# Patient Record
Sex: MALE | Race: BLACK OR AFRICAN AMERICAN | Employment: UNEMPLOYED | URBAN - METROPOLITAN AREA
[De-identification: names, ages, dates, MRNs, and addresses within clinical notes are randomized per-mention and may not be internally consistent; named-entity substitution may affect disease eponyms.]

---

## 2019-12-30 ENCOUNTER — HOSPITAL ENCOUNTER (EMERGENCY)
Age: 29
Discharge: HOME | End: 2019-12-30
Attending: EMERGENCY MEDICINE
Payer: SELF-PAY

## 2019-12-30 VITALS
SYSTOLIC BLOOD PRESSURE: 125 MMHG | WEIGHT: 248 LBS | DIASTOLIC BLOOD PRESSURE: 74 MMHG | HEIGHT: 75 IN | BODY MASS INDEX: 30.84 KG/M2 | TEMPERATURE: 99.1 F | RESPIRATION RATE: 18 BRPM | OXYGEN SATURATION: 99 % | HEART RATE: 88 BPM

## 2019-12-30 DIAGNOSIS — N34.2 URETHRITIS: Primary | ICD-10-CM

## 2019-12-30 LAB
APPEARANCE UR: ABNORMAL
BACTERIA URNS QL MICRO: NEGATIVE /HPF
BILIRUB UR QL CFM: NEGATIVE
COLOR UR: ABNORMAL
EPITH CASTS URNS QL MICRO: ABNORMAL /LPF
GLUCOSE UR STRIP.AUTO-MCNC: NEGATIVE MG/DL
HGB UR QL STRIP: ABNORMAL
KETONES UR QL STRIP.AUTO: NEGATIVE MG/DL
LEUKOCYTE ESTERASE UR QL STRIP.AUTO: ABNORMAL
NITRITE UR QL STRIP.AUTO: NEGATIVE
PH UR STRIP: 6 [PH] (ref 5–8)
PROT UR STRIP-MCNC: NEGATIVE MG/DL
RBC #/AREA URNS HPF: ABNORMAL /HPF (ref 0–5)
SP GR UR REFRACTOMETRY: 1.02 (ref 1–1.03)
UA: UC IF INDICATED,UAUC: ABNORMAL
UROBILINOGEN UR QL STRIP.AUTO: 1 EU/DL (ref 0.2–1)
WBC URNS QL MICRO: ABNORMAL /HPF (ref 0–4)

## 2019-12-30 PROCEDURE — 74011250637 HC RX REV CODE- 250/637: Performed by: NURSE PRACTITIONER

## 2019-12-30 PROCEDURE — 87491 CHLMYD TRACH DNA AMP PROBE: CPT

## 2019-12-30 PROCEDURE — 81001 URINALYSIS AUTO W/SCOPE: CPT

## 2019-12-30 PROCEDURE — 87086 URINE CULTURE/COLONY COUNT: CPT

## 2019-12-30 PROCEDURE — 99283 EMERGENCY DEPT VISIT LOW MDM: CPT

## 2019-12-30 RX ORDER — METRONIDAZOLE 500 MG/1
2000 TABLET ORAL
Status: COMPLETED | OUTPATIENT
Start: 2019-12-30 | End: 2019-12-30

## 2019-12-30 RX ORDER — AZITHROMYCIN 500 MG/1
2000 TABLET, FILM COATED ORAL
Status: COMPLETED | OUTPATIENT
Start: 2019-12-30 | End: 2019-12-30

## 2019-12-30 RX ADMIN — AZITHROMYCIN MONOHYDRATE 2000 MG: 500 TABLET ORAL at 13:09

## 2019-12-30 RX ADMIN — METRONIDAZOLE 2000 MG: 500 TABLET ORAL at 13:39

## 2019-12-30 NOTE — DISCHARGE INSTRUCTIONS
Patient Education        Urethritis: Care Instructions  Your Care Instructions    Urethritis is an infection of the tube that takes urine from the bladder to the outside of the body. This tube is called the urethra. The infection is often caused by bacteria. This can happen if you have a sexually transmitted infection (STI). But a virus may also be a cause. Urethritis is usually treated with antibiotics. Most cases clear up with treatment. Proper treatment is very important. If you don't treat it, the infection can lead to lasting damage of the urethra. Other parts of the urinary system can also be damaged. Follow-up care is a key part of your treatment and safety. Be sure to make and go to all appointments, and call your doctor if you are having problems. It's also a good idea to know your test results and keep a list of the medicines you take. How can you care for yourself at home? · If your doctor prescribed antibiotics, take them as directed. Do not stop taking them just because you feel better. You need to take the full course of antibiotics. · Take an over-the-counter pain medicine, such as acetaminophen (Tylenol), ibuprofen (Advil, Motrin), or naproxen (Aleve), if needed. Be safe with medicines. Read and follow all instructions on the label. · Do not take two or more pain medicines at the same time unless the doctor told you to. Many pain medicines have acetaminophen, which is Tylenol. Too much acetaminophen (Tylenol) can be harmful. · Your doctor may have you take phenazopyridine (Pyridium). This is a pain medicine for the urinary tract. It can turn your urine a deep red-orange. This is normal. Call your doctor if you think you are having a problem with your medicine. · Do not have sex until you are done with treatment. If you do have sex, be sure to use a condom. Your sex partner or partners should be tested too if your urethritis was caused by an STI.   · If your infection was caused by an injury or chemicals, avoid those things if you can. When should you call for help? Call your doctor now or seek immediate medical care if:    · You can't urinate.     · You have symptoms of a urinary infection. For example:  ? You have blood or pus in your urine. ? You have pain in your back just below your rib cage. This is called flank pain. ? You have a fever, chills, or body aches. ? It hurts to urinate. ? You have groin or belly pain.     · You have a hard time urinating when your bladder is full.     · You notice mental changes or feel confused.    Watch closely for changes in your health, and be sure to contact your doctor if:    · You do not get better as expected. Where can you learn more? Go to http://yaron-joy.info/. Enter F290 in the search box to learn more about \"Urethritis: Care Instructions. \"  Current as of: December 19, 2018  Content Version: 12.2  © 9339-9734 [employer]. Care instructions adapted under license by [employer] (which disclaims liability or warranty for this information). If you have questions about a medical condition or this instruction, always ask your healthcare professional. [place] [address] any warranty or liability for your use of this information.

## 2019-12-30 NOTE — ED NOTES
Discharge instructions were given to the patient by Narendra York RN. The patient left the Emergency Department ambulatory, alert and oriented and in no acute distress with 0 prescriptions. The patient was encouraged to call or return to the ED for worsening issues or problems and was encouraged to schedule a follow up appointment for continuing care. The patient verbalized understanding of discharge instructions and prescriptions, all questions were answered. The patient has no further concerns at this time.

## 2019-12-30 NOTE — ED PROVIDER NOTES
EMERGENCY DEPARTMENT HISTORY AND PHYSICAL EXAM    Date: 12/30/2019  Patient Name: Rip Saeed    History of Presenting Illness     Chief Complaint   Patient presents with    Exposure to STD         History Provided By: Patient    HPI: Rip Saeed is a 34 y.o. male with a PMH of No significant past medical history who presents with STD testing. Reports penile d/c intermittently for 4 days. Denies dysuria, urinary frequency or lesions. Pt reports exposure to trichomonas and requesting STD treatment and testing. Denies hx of STD    PCP: Joi Madison MD        Past History     Past Medical History:  Past Medical History:   Diagnosis Date    Other ill-defined conditions(799.89)     ADHD       Past Surgical History:  Past Surgical History:   Procedure Laterality Date    ABDOMEN SURGERY PROC UNLISTED      appendectomy    HX APPENDECTOMY         Family History:  History reviewed. No pertinent family history. Social History:  Social History     Tobacco Use    Smoking status: Current Every Day Smoker     Packs/day: 1.00    Smokeless tobacco: Never Used   Substance Use Topics    Alcohol use: Yes     Comment: social    Drug use: No     Comment: last use 1/9/12       Allergies: Allergies   Allergen Reactions    Amoxicillin Itching         Review of Systems   Review of Systems   Constitutional: Negative for chills, diaphoresis, fatigue and fever. HENT: Negative for sore throat. Respiratory: Negative for cough and shortness of breath. Cardiovascular: Negative for chest pain and leg swelling. Gastrointestinal: Negative for abdominal pain, nausea and vomiting. Genitourinary: Positive for discharge. Negative for dysuria, frequency, genital sores, hematuria, penile pain, penile swelling, scrotal swelling, testicular pain and urgency. Musculoskeletal: Negative for arthralgias and back pain. Skin: Negative for rash. Neurological: Negative for dizziness, numbness and headaches.    All other systems reviewed and are negative. Physical Exam     Vitals:    12/30/19 1231   BP: 125/74   Pulse: 88   Resp: 18   Temp: 99.1 °F (37.3 °C)   SpO2: 99%   Weight: 112.5 kg (248 lb)   Height: 6' 3\" (1.905 m)     Physical Exam  Vitals signs and nursing note reviewed. Constitutional:       General: He is not in acute distress. Appearance: He is well-developed. Cardiovascular:      Rate and Rhythm: Normal rate and regular rhythm. Heart sounds: Normal heart sounds. Pulmonary:      Effort: Pulmonary effort is normal.      Breath sounds: Normal breath sounds. Abdominal:      General: Bowel sounds are normal. There is no distension. Palpations: Abdomen is soft. Tenderness: There is no tenderness. Genitourinary:     Comments: Gu exam deferred   Musculoskeletal: Normal range of motion. Skin:     General: Skin is warm and dry. Neurological:      Mental Status: He is alert and oriented to person, place, and time. GCS: GCS eye subscore is 4. GCS verbal subscore is 5. GCS motor subscore is 6. Cranial Nerves: No cranial nerve deficit. Psychiatric:         Thought Content:  Thought content normal.           Diagnostic Study Results     Labs -     Recent Results (from the past 12 hour(s))   URINALYSIS W/ REFLEX CULTURE    Collection Time: 12/30/19 12:55 PM   Result Value Ref Range    Color YELLOW/STRAW      Appearance CLOUDY (A) CLEAR      Specific gravity 1.020 1.003 - 1.030      pH (UA) 6.0 5.0 - 8.0      Protein NEGATIVE  NEG mg/dL    Glucose NEGATIVE  NEG mg/dL    Ketone NEGATIVE  NEG mg/dL    Blood TRACE (A) NEG      Urobilinogen 1.0 0.2 - 1.0 EU/dL    Nitrites NEGATIVE  NEG      Leukocyte Esterase MODERATE (A) NEG      WBC 10-20 0 - 4 /hpf    RBC 0-5 0 - 5 /hpf    Epithelial cells FEW FEW /lpf    Bacteria NEGATIVE  NEG /hpf    UA:UC IF INDICATED URINE CULTURE ORDERED (A) CNI     BILIRUBIN, CONFIRM    Collection Time: 12/30/19 12:55 PM   Result Value Ref Range    Bilirubin UA, confirm NEGATIVE  NEG         Radiologic Studies -   No orders to display     CT Results  (Last 48 hours)    None        CXR Results  (Last 48 hours)    None            Medical Decision Making   I am the first provider for this patient. I reviewed the vital signs, available nursing notes, past medical history, past surgical history, family history and social history. Vital Signs-Reviewed the patient's vital signs. Records Reviewed: Nursing Notes, Old Medical Records and Previous Laboratory Studies            Disposition:  Discharge     DISCHARGE NOTE:   12:31      Care plan outlined and precautions discussed. Patient has no new complaints, changes, or physical findings. Results of UA were reviewed with the patient. All of pt's questions and concerns were addressed. Patient was instructed and agrees to follow up with health department, as well as to return to the ED upon further deterioration. Patient is ready to go home. Follow-up Information     Follow up With Specialties Details Why [id number]    [address] [place] [address]  [phone]          There are no discharge medications for this patient. Provider Notes (Medical Decision Making):   DDX: Chlamydia, Gonorrhea, urethritis, Candidiasis, Trichomonas, UTI    Procedures:  Procedures    Please note that this dictation was completed with Dragon, computer voice recognition software. Quite often unanticipated grammatical, syntax, homophones, and other interpretive errors are inadvertently transcribed by the computer software. Please disregard these errors. Additionally, please excuse any errors that have escaped final proofreading. Diagnosis     Clinical Impression:   1.  Urethritis

## 2019-12-30 NOTE — LETTER
1/3/2020 [address] [facility] [address] [phone] Dear Mr. Alice Wynne You were seen in the Emergency Department of [address] [place] [address] on 12/30/2019 and had lab and/or radiology tests performed. The chlamydia from your Emergency Department visit on 12/30/2019 was positive. You were treated appropriately during your visit. No further treatment is required. If you have not improved or are worsening, please follow up with your primary care doctor or Emergency department as soon as possible. Please follow up with you [facility] care doctor or health department. Your partner needs to be treated. If you have any questions please contact the Emergency Department at [phone]. Sincerely, Juani Lazaro PA-C 
 
[address] EMERGENCY DEPT 
[address] [facility] [address] [id number] [phone]

## 2019-12-30 NOTE — ED TRIAGE NOTES
Pt states partner tested positive for STD (unsure of name) and is requesting tx. Endorses penile discharge.

## 2019-12-30 NOTE — LETTER
1/2/2020 [address] [facility] [address] [id number] Dear Lakshmi Violet Terrence You were seen in the Emergency Department of [address] [place] [address] on 12/30/19 and had lab tests performed. The chlamydia test from your Emergency Department visit on 12/30/19 was positive. You were treated appropriately during your visit. No further treatment is required. Your partner needs to be treated. If you have any questions please contact the Emergency Department at [phone]. Sincerely, Herrera Bradley PA-C 
 
[facility] EMERGENCY DEPT 
[address] [place] [id number] [phone]

## 2019-12-30 NOTE — ED NOTES
Pt sts was advised by his sexual partner that he had been exposed to a STD. Sts she was positive for tric. Pt sts has had bloody discharge at the meatus in the morning. Denies dysuria, nausea, vomiting. Emergency Department Nursing Plan of Care       The Nursing Plan of Care is developed from the Nursing assessment and Emergency Department Attending provider initial evaluation. The plan of care may be reviewed in the ED Provider note.     The Plan of Care was developed with the following considerations:   Patient / Family readiness to learn indicated by:verbalized understanding  Persons(s) to be included in education: patient  Barriers to Learning/Limitations:No    Signed     Brenda Martin RN    12/30/2019   12:59 PM

## 2020-01-02 LAB
C TRACH DNA SPEC QL NAA+PROBE: POSITIVE
N GONORRHOEA DNA SPEC QL NAA+PROBE: NEGATIVE
SAMPLE TYPE: ABNORMAL
SERVICE CMNT-IMP: ABNORMAL
SPECIMEN SOURCE: ABNORMAL

## 2020-01-03 LAB
BACTERIA SPEC CULT: NORMAL
CC UR VC: NORMAL
SERVICE CMNT-IMP: NORMAL

## 2020-08-01 ENCOUNTER — HOSPITAL ENCOUNTER (EMERGENCY)
Age: 30
Discharge: HOME | End: 2020-08-01
Attending: EMERGENCY MEDICINE

## 2020-08-01 VITALS
TEMPERATURE: 98.2 F | RESPIRATION RATE: 18 BRPM | HEART RATE: 98 BPM | OXYGEN SATURATION: 97 % | WEIGHT: 242 LBS | DIASTOLIC BLOOD PRESSURE: 85 MMHG | SYSTOLIC BLOOD PRESSURE: 141 MMHG | HEIGHT: 74 IN | BODY MASS INDEX: 31.06 KG/M2

## 2020-08-01 DIAGNOSIS — J02.9 PHARYNGITIS, UNSPECIFIED ETIOLOGY: ICD-10-CM

## 2020-08-01 DIAGNOSIS — K04.7 DENTAL ABSCESS: Primary | ICD-10-CM

## 2020-08-01 PROCEDURE — 99283 EMERGENCY DEPT VISIT LOW MDM: CPT

## 2020-08-01 PROCEDURE — 74011250637 HC RX REV CODE- 250/637: Performed by: EMERGENCY MEDICINE

## 2020-08-01 RX ORDER — CLINDAMYCIN HYDROCHLORIDE 300 MG/1
300 CAPSULE ORAL 4 TIMES DAILY
Qty: 40 CAP | Refills: 0 | Status: SHIPPED | OUTPATIENT
Start: 2020-08-01

## 2020-08-01 RX ORDER — CLINDAMYCIN HYDROCHLORIDE 150 MG/1
300 CAPSULE ORAL
Status: COMPLETED | OUTPATIENT
Start: 2020-08-01 | End: 2020-08-01

## 2020-08-01 RX ADMIN — CLINDAMYCIN HYDROCHLORIDE 300 MG: 150 CAPSULE ORAL at 14:51

## 2020-08-01 NOTE — ED TRIAGE NOTES
Patient presents to ED with c/o vomiting for two days. State that it may have come from eating [address].  Patient admits to smoking marijuana

## 2020-08-01 NOTE — ED NOTES
Discharge instructions were given to the patient by TERESA Pulido RN. .     The patient left the Emergency Department ambulatory, alert and oriented and in no acute distress with 1 prescription(s). The patient was encouraged to call or return to the ED for worsening symptoms or problems and was encouraged to schedule a follow up appointment for continuing care. Patient leaving ED accompanied by self. The patient verbalized understanding of discharge instructions and prescriptions, all questions were answered. The patient has no further concerns at this time. Patient declined wheelchair transfer upon ED discharge.

## 2020-08-01 NOTE — ED PROVIDER NOTES
EMERGENCY DEPARTMENT HISTORY AND PHYSICAL EXAM      Date: 8/1/2020  Patient Name: Shon Hutton    History of Presenting Illness     Chief Complaint   Patient presents with    Vomiting       History Provided By: Patient    HPI: Shon Hutton, 31 y.o. male with PMHx of ADHD presents to the ED with cc of mild to moderate dental pain for a few days. Pt states \"it hurts to swallow\". Pt denies any other associated symptoms. Pt denies alleviating and exacerbating factors. Denies chills, SOB, CP, Abd pain. There are no other complaints, changes, or physical findings at this time. PCP: None    No current facility-administered medications on file prior to encounter. No current outpatient medications on file prior to encounter. Past History     Past Medical History:  Past Medical History:   Diagnosis Date    Other ill-defined conditions(799.89)     ADHD       Past Surgical History:  Past Surgical History:   Procedure Laterality Date    ABDOMEN SURGERY PROC UNLISTED      appendectomy    HX APPENDECTOMY         Family History:  No family history on file. Social History:  Social History     Tobacco Use    Smoking status: Current Every Day Smoker     Packs/day: 1.00    Smokeless tobacco: Never Used   Substance Use Topics    Alcohol use: Yes     Comment: social    Drug use: No     Comment: last use 1/9/12       Allergies: Allergies   Allergen Reactions    Amoxicillin Itching         Review of Systems   Review of Systems   Constitutional: Negative. Negative for chills, diaphoresis and fever. HENT: Positive for dental problem. Negative for congestion, ear pain, sore throat and trouble swallowing. Eyes: Negative. Negative for photophobia, pain, redness and visual disturbance. Respiratory: Negative. Negative for cough, chest tightness, shortness of breath and wheezing. Cardiovascular: Negative. Negative for chest pain and palpitations. Gastrointestinal: Negative.   Negative for abdominal pain, blood in stool, diarrhea, nausea and vomiting. Genitourinary: Negative for dysuria and frequency. Musculoskeletal: Negative. Negative for back pain, joint swelling and neck pain. Skin: Negative. Neurological: Negative. Negative for seizures, syncope and headaches. Psychiatric/Behavioral: Negative. Negative for behavioral problems and confusion. The patient is not nervous/anxious. All other systems reviewed and are negative. Physical Exam   Physical Exam  Vitals signs and nursing note reviewed. Constitutional:       General: He is not in acute distress. HENT:      Head: Normocephalic and atraumatic. Mouth/Throat:      Mouth: Mucous membranes are moist.      Comments: Erythema R tonsil, no exudates, no evidence of PTA,  tooth fracture 1st R lower with a surrounding tooth abscess  Eyes:      Conjunctiva/sclera: Conjunctivae normal.      Pupils: Pupils are equal, round, and reactive to light. Neck:      Musculoskeletal: Normal range of motion. Cardiovascular:      Rate and Rhythm: Normal rate and regular rhythm. Pulses: Normal pulses. Pulmonary:      Effort: Pulmonary effort is normal. No respiratory distress. Breath sounds: Normal breath sounds. Abdominal:      General: Bowel sounds are normal. There is no distension. Palpations: Abdomen is soft. Tenderness: There is no abdominal tenderness. Musculoskeletal: Normal range of motion. Skin:     Capillary Refill: Capillary refill takes less than 2 seconds. Findings: No rash. Neurological:      General: No focal deficit present. Mental Status: He is alert and oriented to person, place, and time. Cranial Nerves: No cranial nerve deficit. Psychiatric:         Behavior: Behavior normal.         Diagnostic Study Results     Labs -   No results found for this or any previous visit (from the past 12 hour(s)).     Radiologic Studies -   No orders to display     CT Results  (Last 48 hours)    None        CXR Results  (Last 48 hours)    None          Medical Decision Making   I am the first provider for this patient. I reviewed the vital signs, available nursing notes, past medical history, past surgical history, family history and social history. Vital Signs-Reviewed the patient's vital signs. Patient Vitals for the past 12 hrs:   Temp Pulse Resp BP SpO2   08/01/20 1304 98.2 °F (36.8 °C) 98 18 141/85 97 %         Records Reviewed: Nursing Notes and Old Medical Records    Provider Notes (Medical Decision Making):   Ddx: fx tooth, dental abscess, sore throat    ED Course:   Initial assessment performed. The patients presenting problems have been discussed, and they are in agreement with the care plan formulated and outlined with them. I have encouraged them to ask questions as they arise throughout their visit. Critical Care Time:   none      Disposition:  DISCHARGE  2:42 PM  The patient has been re-evaluated and is ready for discharge. Reviewed available results with patient. Counseled pt on diagnosis and care plan. Pt has expressed understanding, and all questions have been answered. Pt agrees with plan and agrees to follow up as recommended, or return to the ED if their symptoms worsen. Discharge instructions have been provided and explained to the pt, along with reasons to return to the ED. DISCHARGE PLAN:  1. Current Discharge Medication List      START taking these medications    Details   clindamycin (CLEOCIN) 300 mg capsule Take 1 Cap by mouth four (4) times daily. Qty: 40 Cap, Refills: 0           2. Follow-up Information     Follow up With Specialties Details Why Contact Info    [facility]  In 1 week  [address]  [phone]        3. Return to ED if worse     Diagnosis     Clinical Impression:   1. Dental abscess    2. Pharyngitis, unspecified etiology        Attestations:   This note is prepared by Saima Knight acting as Scribe for Tc Torres MD.     Tc Torres MD. The scribe's documentation has been prepared under my direction and personally reviewed by me in its entirety. I confirm that the note above accurately reflects all work, treatment, procedures and medical decision making performed by me.

## 2020-08-01 NOTE — ED NOTES
Emergency Department Nursing Plan of Care       The Nursing Plan of Care is developed from the Nursing assessment and Emergency Department Attending provider initial evaluation. The plan of care may be reviewed in the ED Provider note. The Plan of Care was developed with the following considerations:   Patient / Family readiness to learn indicated by:verbalized understanding  Persons(s) to be included in education: patient  Barriers to Learning/Limitations:No    Signed     Ewelina Ulloa    8/1/2020   2:56 PM    Patient is alert and oriented x 4 and in no acute distress at this time. Respirations are at a regular rate, depth, and pattern. Patient updated on plan of care and has no questions or concerns at this time. Call bell within reach. Will continue to monitor. Please reference nursing assessment.

## 2021-10-19 ENCOUNTER — HOSPITAL ENCOUNTER (EMERGENCY)
Age: 31
Discharge: HOME | End: 2021-10-19
Attending: EMERGENCY MEDICINE | Admitting: EMERGENCY MEDICINE

## 2021-10-19 VITALS
RESPIRATION RATE: 16 BRPM | OXYGEN SATURATION: 94 % | TEMPERATURE: 98.1 F | SYSTOLIC BLOOD PRESSURE: 134 MMHG | HEART RATE: 70 BPM | DIASTOLIC BLOOD PRESSURE: 79 MMHG

## 2021-10-19 DIAGNOSIS — J02.9 PHARYNGITIS, UNSPECIFIED ETIOLOGY: ICD-10-CM

## 2021-10-19 DIAGNOSIS — R51.9 ACUTE NONINTRACTABLE HEADACHE, UNSPECIFIED HEADACHE TYPE: Primary | ICD-10-CM

## 2021-10-19 DIAGNOSIS — R50.9 FEVER, UNSPECIFIED FEVER CAUSE: ICD-10-CM

## 2021-10-19 LAB
S PYO AG THROAT QL: NEGATIVE
SARS-COV-2, COV2: NORMAL

## 2021-10-19 PROCEDURE — 74011250636 HC RX REV CODE- 250/636: Performed by: PHYSICIAN ASSISTANT

## 2021-10-19 PROCEDURE — 99283 EMERGENCY DEPT VISIT LOW MDM: CPT

## 2021-10-19 PROCEDURE — 87880 STREP A ASSAY W/OPTIC: CPT

## 2021-10-19 PROCEDURE — 87070 CULTURE OTHR SPECIMN AEROBIC: CPT

## 2021-10-19 PROCEDURE — 96372 THER/PROPH/DIAG INJ SC/IM: CPT

## 2021-10-19 PROCEDURE — U0005 INFEC AGEN DETEC AMPLI PROBE: HCPCS

## 2021-10-19 RX ORDER — KETOROLAC TROMETHAMINE 30 MG/ML
30 INJECTION, SOLUTION INTRAMUSCULAR; INTRAVENOUS
Status: COMPLETED | OUTPATIENT
Start: 2021-10-19 | End: 2021-10-19

## 2021-10-19 RX ADMIN — KETOROLAC TROMETHAMINE 30 MG: 30 INJECTION, SOLUTION INTRAMUSCULAR; INTRAVENOUS at 23:06

## 2021-10-20 ENCOUNTER — PATIENT OUTREACH (OUTPATIENT)
Dept: CASE MANAGEMENT | Age: 31
End: 2021-10-20

## 2021-10-20 ENCOUNTER — HOSPITAL ENCOUNTER (EMERGENCY)
Age: 31
Discharge: LEFT BEFORE DISCHARGE | End: 2021-10-20
Admitting: EMERGENCY MEDICINE

## 2021-10-20 LAB
SARS-COV-2, XPLCVT: NOT DETECTED
SOURCE, COVRS: NORMAL

## 2021-10-20 PROCEDURE — 75810000275 HC EMERGENCY DEPT VISIT NO LEVEL OF CARE

## 2021-10-20 NOTE — DISCHARGE INSTRUCTIONS
Return for new or worsening symptoms such as neck stiffness, persistent vomiting, severe chest pain, coughing up blood, inability to swallow. Take ibuprofen, 3 to 4 tablets every 6-8 hours, for no more than 5 days; this will likely be more effective than Tylenol for your symptoms. Drink plenty of fluids. Your strep test tonight was negative, but we have sent the swab to the lab for culture to be sure it is not a bacterial infection. If your Covid test is positive, somebody will call you in 24 to 48 hours.   If it is negative, you will not receive a phone call but you can find the results in your online portal.

## 2021-10-20 NOTE — ED TRIAGE NOTES
Patient arrives ambulatory from home with CC of a sore throat on the left side and a left sided headache. Also endorses shortness of breath and a cough x 3-4 days. +fever Patient's girlfriend had about 3 weeks ago. Patient is not Covid vaccinated.

## 2021-10-20 NOTE — ED PROVIDER NOTES
36year old male presenting to the ED for multiple complaints. Reports headache, sore throat (left sided), subjective fever, chills, body aches, diarrhea, occasional cough, SOB. Moderate, no modifying factors noted. Had \"3 different types of Tylenol\" today - most recent dose about 4 hours ago. Girlfriend had COVID 3 weeks ago. Unvaccinated. No other sick contacts. PMHx: ADHD  PSx: appendectomy  Social: + smoker. Social.  Occasional marijuana. About to start working at [employer]. Allergies: Amox    The history is provided by the patient. Past Medical History:   Diagnosis Date    Other ill-defined conditions(159.14)     ADHD       Past Surgical History:   Procedure Laterality Date    HX APPENDECTOMY      [place] ABDOMEN SURGERY PROC UNLISTED      appendectomy         History reviewed. No pertinent family history. Social History     Socioeconomic History    Marital status: SINGLE     Spouse name: Not on file    Number of children: Not on file    Years of education: Not on file    Highest education level: Not on file   Occupational History    Not on file   Tobacco Use    Smoking status: Current Every Day Smoker     Packs/day: 1.00    Smokeless tobacco: Never Used   Substance and Sexual Activity    Alcohol use: Yes     Comment: social    Drug use: Yes     Types: Marijuana     Comment: last used yesterday     Sexual activity: Yes     Partners: Female   Other Topics Concern    Not on file   Social History Narrative    Not on file     Social Determinants of Health     Financial Resource Strain:     Difficulty of Paying Living Expenses:    Food Insecurity:     Worried About Running Out of Food in the Last Year:     [address] in the Last Year:    Transportation Needs:     Lack of Transportation (Medical):      Lack of Transportation (Non-Medical):    Physical Activity:     Days of Exercise per Week:     Minutes of Exercise per Session:    Stress:     Feeling of Stress :    Social Connections:     Frequency of Communication with Friends and Family:     Frequency of Social Gatherings with Friends and Family:     Attends [employer] Services:     Active Member of Clubs or Organizations:     Attends Club or Organization Meetings:     Marital Status:    Intimate Partner Violence:     Fear of Current or Ex-Partner:     Emotionally Abused:     Physically Abused:     Sexually Abused: ALLERGIES: Amoxicillin    Review of Systems   Constitutional: Positive for chills, fatigue and fever. HENT: Positive for sore throat. Negative for congestion. Eyes: Negative for discharge. Respiratory: Positive for cough and shortness of breath. Cardiovascular: Negative for leg swelling. Gastrointestinal: Positive for diarrhea. Negative for vomiting. Genitourinary: Negative for difficulty urinating. Musculoskeletal: Positive for back pain and myalgias. Negative for joint swelling. Skin: Negative for wound. Neurological: Positive for headaches. Negative for syncope. Psychiatric/Behavioral: Negative for behavioral problems. All other systems reviewed and are negative. Vitals:    10/19/21 2227   BP: (!) 158/92   Pulse: 81   Resp: 16   Temp: 98 °F (36.7 °C)   SpO2: 97%            Physical Exam  Vitals and nursing note reviewed. Constitutional:       General: He is not in acute distress. Appearance: He is well-developed. Comments: Pleasant black male, appears to not feel well, no acute distress   HENT:      Head: Normocephalic and atraumatic. Comments: No gum swelling  No submandibular or sublingual swelling  No trismus, stridor, drooling     Right Ear: Tympanic membrane and external ear normal.      Left Ear: Tympanic membrane and external ear normal.      Mouth/Throat:      Comments: Midline uvula, mild erythema without exudate, tonsils symmetric  Eyes:      General: No scleral icterus.      Conjunctiva/sclera: Conjunctivae normal.   Neck:      Trachea: No tracheal deviation. Cardiovascular:      Rate and Rhythm: Normal rate and regular rhythm. Heart sounds: Normal heart sounds. No murmur heard. No friction rub. No gallop. Pulmonary:      Effort: Pulmonary effort is normal. No respiratory distress. Breath sounds: Normal breath sounds. No stridor. No wheezing. Abdominal:      General: There is no distension. Palpations: Abdomen is soft. Tenderness: There is no abdominal tenderness. Musculoskeletal:         General: Normal range of motion. Cervical back: Neck supple. Lymphadenopathy:      Cervical: No cervical adenopathy. Skin:     General: Skin is warm and dry. Neurological:      Mental Status: He is alert and oriented to person, place, and time. Psychiatric:         Behavior: Behavior normal.          MDM  Number of Diagnoses or Management Options  Acute nonintractable headache, unspecified headache type  Fever, unspecified fever cause  Pharyngitis, unspecified etiology  Diagnosis management comments: 53-year-old male presenting to the ED for multiple complaints for 2 to 3 days, headache, chills, subjective fever, sore throat, slight cough, body aches, diarrhea. Patient is not vaccinated for COVID-19. Nonfocal exam.  Reassuring vital signs.   Will treat symptoms, check POC strep and also send Covid test.       Amount and/or Complexity of Data Reviewed  Clinical lab tests: ordered and reviewed  Discuss the patient with other providers: yes (Dr. Vel Fernando ED attending)           Procedures

## 2021-10-20 NOTE — PROGRESS NOTES
Patient contacted regarding ED discharge and COVID-19 risk. Unable to reach patient by phone. No working phone number on file and no [employer] account. Episode resolved.

## 2021-10-21 LAB
BACTERIA SPEC CULT: NORMAL
SERVICE CMNT-IMP: NORMAL

## 2023-04-19 ENCOUNTER — HOSPITAL ENCOUNTER (EMERGENCY)
Age: 33
Discharge: HOME | End: 2023-04-19
Attending: EMERGENCY MEDICINE
Payer: MEDICAID

## 2023-04-19 VITALS
DIASTOLIC BLOOD PRESSURE: 98 MMHG | OXYGEN SATURATION: 98 % | RESPIRATION RATE: 16 BRPM | WEIGHT: 315 LBS | TEMPERATURE: 98.5 F | SYSTOLIC BLOOD PRESSURE: 156 MMHG | HEIGHT: 75 IN | HEART RATE: 63 BPM | BODY MASS INDEX: 39.17 KG/M2

## 2023-04-19 DIAGNOSIS — N47.1 PHIMOSIS: Primary | ICD-10-CM

## 2023-04-19 LAB
APPEARANCE UR: CLEAR
BACTERIA URNS QL MICRO: NEGATIVE /HPF
BILIRUB UR QL: NEGATIVE
C TRACH DNA SPEC QL NAA+PROBE: NEGATIVE
COLOR UR: ABNORMAL
EPITH CASTS URNS QL MICRO: ABNORMAL /LPF
GLUCOSE UR STRIP.AUTO-MCNC: NEGATIVE MG/DL
HGB UR QL STRIP: NEGATIVE
KETONES UR QL STRIP.AUTO: ABNORMAL MG/DL
LEUKOCYTE ESTERASE UR QL STRIP.AUTO: ABNORMAL
N GONORRHOEA DNA SPEC QL NAA+PROBE: NEGATIVE
NITRITE UR QL STRIP.AUTO: NEGATIVE
PH UR STRIP: 6.5 (ref 5–8)
PROT UR STRIP-MCNC: NEGATIVE MG/DL
RBC #/AREA URNS HPF: ABNORMAL /HPF (ref 0–5)
SAMPLE TYPE: NORMAL
SERVICE CMNT-IMP: NORMAL
SP GR UR REFRACTOMETRY: 1.02
SPECIMEN SOURCE: NORMAL
UA: UC IF INDICATED,UAUC: ABNORMAL
UROBILINOGEN UR QL STRIP.AUTO: 1 EU/DL (ref 0.2–1)
WBC URNS QL MICRO: ABNORMAL /HPF (ref 0–4)

## 2023-04-19 PROCEDURE — 99283 EMERGENCY DEPT VISIT LOW MDM: CPT

## 2023-04-19 PROCEDURE — 81001 URINALYSIS AUTO W/SCOPE: CPT

## 2023-04-19 PROCEDURE — 87491 CHLMYD TRACH DNA AMP PROBE: CPT

## 2023-04-19 RX ORDER — TRIAMCINOLONE ACETONIDE 0.25 MG/G
OINTMENT TOPICAL 2 TIMES DAILY
Qty: 30 G | Refills: 0 | Status: SHIPPED | OUTPATIENT
Start: 2023-04-19 | End: 2023-05-17

## 2023-04-19 NOTE — ED NOTES
Emergency Department Nursing Plan of Care       The Nursing Plan of Care is developed from the Nursing assessment and Emergency Department Attending provider initial evaluation. The plan of care may be reviewed in the ED Provider note.     The Plan of Care was developed with the following considerations:   Patient / Family readiness to learn indicated by:verbalized understanding  Persons(s) to be included in education: patient  Barriers to Learning/Limitations:No    Signed     Xin Roldan RN    4/19/2023   1:45 PM

## 2023-04-19 NOTE — ED PROVIDER NOTES
[facility] EMERGENCY DEPT  EMERGENCY DEPARTMENT ENCOUNTER       Pt Name: Lucero Guadarrama  MRN: [id number]  [place] 1990  Date of evaluation: 4/19/2023  Provider: MARY Meyer   PCP: None  Note Started: 1:35 PM 4/19/23     ED attending involment: I have seen and discussed the patient with my supervising physician, Dr. Samara Warner   Patient presents with    Penis Pain    Sexually Transmitted Disease        HISTORY OF PRESENT ILLNESS: 1 or more elements      History From: Patient  HPI Limitations : None     Lucero Guadarrama is a 34 y.o. male who presents with penis pain and swelling. The patient had anal sex 2 days ago and the condom broke. He reports he sustained some abrasions to the foreskin of his penis. He has been unable to retract it since then. He has been able to urinate without difficulty but says it is painful to do so. Nursing Notes were all reviewed and agreed with or any disagreements were addressed in the HPI. REVIEW OF SYSTEMS      Review of Systems     Positives and Pertinent negatives as per HPI. PAST HISTORY     Past Medical History:  Past Medical History:   Diagnosis Date    Other ill-defined conditions(799.89)     ADHD       Past Surgical History:  Past Surgical History:   Procedure Laterality Date    HX APPENDECTOMY      [place] UNLISTED PROCEDURE ABDOMEN PERITONEUM & OMENTUM      appendectomy       Family History:  History reviewed. No pertinent family history. Social History:  Social History     Tobacco Use    Smoking status: Every Day     Packs/day: 1.00     Types: Cigarettes    Smokeless tobacco: Never   Substance Use Topics    Alcohol use: Yes     Comment: social    Drug use: Yes     Types: Marijuana       Allergies:   Allergies   Allergen Reactions    Amoxicillin Itching       CURRENT MEDICATIONS      Discharge Medication List as of 4/19/2023  2:20 PM        CONTINUE these medications which have NOT CHANGED    Details   clindamycin (CLEOCIN) 300 mg capsule Take 1 Cap by mouth four (4) times daily. , Print, Disp-40 Cap,R-0             PHYSICAL EXAM      ED Triage Vitals [04/19/23 1309]   ED Encounter Vitals Group      BP (!) 156/98      Pulse (Heart Rate) 63      Resp Rate 16      Temp 98.5 °F (36.9 °C)      Temp src       O2 Sat (%) 98 %      Weight 320 lb      Height 6' 3\"        Physical Exam  Vitals and nursing note reviewed. Constitutional:       General: He is not in acute distress. Genitourinary:     Comments: The patient has multiple fissures noted to the foreskin with some edema. He is unable to retract the foreskin. No necrotic lesions. Neurological:      Mental Status: He is alert. DIAGNOSTIC RESULTS   LABS:     Recent Results (from the past 12 hour(s))   URINALYSIS W/ REFLEX CULTURE    Collection Time: 04/19/23  1:42 PM    Specimen: Urine   Result Value Ref Range    Color YELLOW/STRAW      Appearance CLEAR CLEAR      Specific gravity 1.020      pH (UA) 6.5 5.0 - 8.0      Protein Negative NEG mg/dL    Glucose Negative NEG mg/dL    Ketone TRACE (A) NEG mg/dL    Bilirubin Negative NEG      Blood Negative NEG      Urobilinogen 1.0 0.2 - 1.0 EU/dL    Nitrites Negative NEG      Leukocyte Esterase SMALL (A) NEG      WBC 0-4 0 - 4 /hpf    RBC 0-5 0 - 5 /hpf    Epithelial cells FEW FEW /lpf    Bacteria Negative NEG /hpf    UA:UC IF INDICATED CULTURE NOT INDICATED BY UA RESULT CNI             RADIOLOGY:  Non-plain film images such as CT, Ultrasound and MRI are read by the radiologist. Plain radiographic images are visualized and preliminarily interpreted by myself, Jt PresMARY beasley, ED provider   with the below findings:          Interpretation per the Radiologist below, if available at the time of this note:     No results found.       PROCEDURES   Unless otherwise noted below, none  Procedures     EMERGENCY DEPARTMENT COURSE and DIFFERENTIAL DIAGNOSIS/MDM   Vitals:    Vitals:    04/19/23 1309   BP: (!) 156/98   Pulse: 63 Resp: 16   Temp: 98.5 °F (36.9 °C)   SpO2: 98%   Weight: 145.2 kg (320 lb)   Height: 6' 3\" (1.905 m)        Patient was given the following medications:  Medications - No data to display    CONSULTS: (Who and What was discussed)  None    Chronic Conditions: None    Social Determinants affecting Dx or Tx: None    Records Reviewed (source and summary): Prior medical records    MDM (CC/HPI Summary, DDx, ED Course, Reassessment, Disposition Considerations -Tests not done, Shared Decision Making, Pt Expectation of Test or Tx.): This is a 81-year-old male who presents with penile pain and swelling of the foreskin since sexual intercourse 2 days ago. The patient the patient has had swelling and pain of the foreskin with inability to retract foreskin. He denies urinary retention. Differential diagnosis includes phimosis, urinary tract infection, STI. On exam, patient has swelling of the foreskin with few fissures noted and tenderness to palpation. Exam consistent with phimosis. Given no urinary retention at this time, he is appropriate for outpatient management with topical corticosteroid. STI testing was sent and is pending. Advise close follow-up with urology for a recheck and further management. Discussed return precautions. FINAL IMPRESSION     1. Phimosis          DISPOSITION/PLAN   Discharged      The pt is ready for discharge. The pt's signs, symptoms, diagnosis, and discharge instructions have been discussed and pt has conveyed their understanding. The pt is to follow up as recommended or return to ER should their symptoms worsen. Plan has been discussed and pt is in agreement.          PATIENT REFERRED TO:  Follow-up Information       Follow up With Specialties Details Why Contact Info    u Urology  Schedule an appointment as soon as possible for a visit   Fabrice Martinez [phone] [facility] Schedule an appointment as soon as possible for a visit   [address] [place] [address]  [phone]    [facility] EMERGENCY DEPT Emergency Medicine Go to  if unable to urinate [address]              DISCHARGE MEDICATIONS:  Discharge Medication List as of 4/19/2023  2:20 PM        START taking these medications    Details   triamcinolone acetonide (KENALOG) 0.025 % ointment Apply  to affected area two (2) times a day for 28 days. use thin layer, Normal, Disp-30 g, R-0           CONTINUE these medications which have NOT CHANGED    Details   clindamycin (CLEOCIN) 300 mg capsule Take 1 Cap by mouth four (4) times daily. , Print, Disp-40 Cap,R-0               DISCONTINUED MEDICATIONS:  Discharge Medication List as of 4/19/2023  2:20 PM          I am the Primary Clinician of Record. MARY Bond (electronically signed)    (Please note that parts of this dictation were completed with voice recognition software. Quite often unanticipated grammatical, syntax, homophones, and other interpretive errors are inadvertently transcribed by the computer software. Please disregards these errors.  Please excuse any errors that have escaped final proofreading.)

## 2023-04-19 NOTE — ED TRIAGE NOTES
Pt reports having unprotected sex last night, concerned for STD and pt also c/o irritation around the penis. Denies penile discharge, denies dysuria.

## 2023-06-23 ENCOUNTER — HOSPITAL ENCOUNTER (EMERGENCY)
Facility: HOSPITAL | Age: 33
Discharge: HOME | End: 2023-06-23
Attending: EMERGENCY MEDICINE
Payer: MEDICAID

## 2023-06-23 VITALS
HEART RATE: 75 BPM | DIASTOLIC BLOOD PRESSURE: 84 MMHG | WEIGHT: 315 LBS | HEIGHT: 75 IN | OXYGEN SATURATION: 98 % | TEMPERATURE: 98.3 F | SYSTOLIC BLOOD PRESSURE: 137 MMHG | BODY MASS INDEX: 39.17 KG/M2 | RESPIRATION RATE: 20 BRPM

## 2023-06-23 DIAGNOSIS — Z20.2 EXPOSURE TO SEXUALLY TRANSMITTED DISEASE (STD): Primary | ICD-10-CM

## 2023-06-23 LAB
APPEARANCE UR: CLEAR
BACTERIA URNS QL MICRO: NEGATIVE /HPF
BILIRUB UR QL: NEGATIVE
C TRACH DNA SPEC QL NAA+PROBE: NEGATIVE
COLOR UR: ABNORMAL
EPITH CASTS URNS QL MICRO: ABNORMAL /LPF
GLUCOSE UR STRIP.AUTO-MCNC: NEGATIVE MG/DL
HGB UR QL STRIP: NEGATIVE
KETONES UR QL STRIP.AUTO: NEGATIVE MG/DL
LEUKOCYTE ESTERASE UR QL STRIP.AUTO: ABNORMAL
N GONORRHOEA DNA SPEC QL NAA+PROBE: NEGATIVE
NITRITE UR QL STRIP.AUTO: NEGATIVE
PH UR STRIP: 5.5 (ref 5–8)
PROT UR STRIP-MCNC: NEGATIVE MG/DL
RBC #/AREA URNS HPF: ABNORMAL /HPF (ref 0–5)
SAMPLE TYPE: NORMAL
SERVICE CMNT-IMP: NORMAL
SP GR UR REFRACTOMETRY: 1.01
SPECIMEN SOURCE: NORMAL
URINE CULTURE IF INDICATED: ABNORMAL
UROBILINOGEN UR QL STRIP.AUTO: 0.2 EU/DL (ref 0.2–1)
WBC URNS QL MICRO: ABNORMAL /HPF (ref 0–4)

## 2023-06-23 PROCEDURE — 87591 N.GONORRHOEAE DNA AMP PROB: CPT

## 2023-06-23 PROCEDURE — 87491 CHLMYD TRACH DNA AMP PROBE: CPT

## 2023-06-23 PROCEDURE — 81001 URINALYSIS AUTO W/SCOPE: CPT

## 2023-06-23 PROCEDURE — 99283 EMERGENCY DEPT VISIT LOW MDM: CPT

## 2023-06-23 RX ORDER — MOXIFLOXACIN HYDROCHLORIDE 400 MG/1
400 TABLET ORAL DAILY
Qty: 7 TABLET | Refills: 0 | Status: SHIPPED | OUTPATIENT
Start: 2023-06-23 | End: 2023-06-30

## 2023-06-23 RX ORDER — DOXYCYCLINE HYCLATE 100 MG
100 TABLET ORAL 2 TIMES DAILY
Qty: 14 TABLET | Refills: 0 | Status: SHIPPED | OUTPATIENT
Start: 2023-06-23 | End: 2023-06-30

## 2023-06-23 ASSESSMENT — PAIN - FUNCTIONAL ASSESSMENT: PAIN_FUNCTIONAL_ASSESSMENT: NONE - DENIES PAIN

## 2023-06-23 NOTE — ED NOTES
Patient (s)  given copy of dc instructions and 3 script(s). Patient (s)  verbalized understanding of instructions and script (s). Patient given a current medication reconciliation form and verbalized understanding of their medications. Patient (s) verbalized understanding of the importance of discussing medications with his or her physician or clinic they will be following up with. Patient alert and oriented and in no acute distress. Patient discharged home, patient offered wheelchair, patient declines wheelchair.              [address]  06/23/23 1172

## 2023-06-23 NOTE — ED NOTES
Patient states here for c/o STI. Patient states a girl he was seeing had \"some sort of years infection or something\", states that he was prescribed a cream that cleared his symptoms, but then states that he went back to her and his symptoms have returned again. Patient denies receiving any shots or pills on his last visit 2 months ago. Patient denies fevers. Patient states that he had an allergy to Amoxicillin as a child but states no known allergies as an adult. Emergency Department Nursing Plan of Care       The Nursing Plan of Care is developed from the Nursing assessment and Emergency Department Attending provider initial evaluation. The plan of care may be reviewed in the ED Provider note.       The Plan of Care was developed with the following considerations:  Patient / Family readiness to learn indicated by:verbalized understanding  Persons(s) to be included in education: patient  Barriers to Learning/Limitations:None      Signed     Doyle Gaona RN    6/23/2023   10:12 AM         Doyle Gaona RN  06/23/23 1012

## 2023-06-23 NOTE — ED PROVIDER NOTES
care-plan and conveys that all of his questions have been answered. I have also provided discharge instructions for him that include: educational information regarding their diagnosis and treatment, and list of reasons why they would want to return to the ED prior to their follow-up appointment, should his condition change. CLINICAL IMPRESSION    DISPOSITION Decision To Discharge 06/23/2023 11:15:26 AM    PATIENT REFERRED TO:  [facility] EMERGENCY DEPT  [address] [address]  [phone]    If symptoms worsen    [facility] Department  [address] [facility] [address], [address]  Phone: [phone]  Schedule an appointment as soon as possible for a visit in 1 week         DISCHARGE MEDICATIONS:     Medication List        START taking these medications      doxycycline hyclate 100 MG tablet  Commonly known as: VIBRA-TABS  Take 1 tablet by mouth 2 times daily for 7 days     miconazole 2 % cream  Commonly known as: Miconazole Antifungal  Apply topically 2 times daily for 7 days. moxifloxacin 400 MG tablet  Commonly known as: AVELOX  Take 1 tablet by mouth daily for 7 days Start after completion of doxycycline               Where to Get Your Medications        These medications were sent to [facility] [address] & [address], [address] [place] [address]      Phone: [phone]   doxycycline hyclate 100 MG tablet  miconazole 2 % cream  moxifloxacin 400 MG tablet           DISCONTINUED MEDICATIONS:  Current Discharge Medication List          I am the Primary Clinician of Record. Laurie Evans MD (electronically signed)    (Please note that parts of this dictation were completed with voice recognition software. Quite often unanticipated grammatical, syntax, homophones, and other interpretive errors are inadvertently transcribed by the computer software. Please disregards these errors.  Please excuse any errors

## 2023-06-23 NOTE — DISCHARGE INSTRUCTIONS
You were seen in the emergency department for your symptoms. Please put the cream on the rash for 1 week. Please use the doxycycline followed by the moxifloxacin to treat mycoplasma/Ureaplasma. Please follow-up with your primary care doctor or the health department. Return for new or worsening symptoms and time.

## 2023-06-23 NOTE — ED TRIAGE NOTES
Patient presents to ED with \" my girl tested positive for mycoplasma haminis-ureaplasma and my stuff don't look right, it looks different. \"

## 2023-10-18 ENCOUNTER — HOSPITAL ENCOUNTER (EMERGENCY)
Facility: HOSPITAL | Age: 33
Discharge: HOME | End: 2023-10-18
Attending: EMERGENCY MEDICINE
Payer: MEDICAID

## 2023-10-18 VITALS
SYSTOLIC BLOOD PRESSURE: 129 MMHG | OXYGEN SATURATION: 99 % | WEIGHT: 315 LBS | HEART RATE: 76 BPM | HEIGHT: 75 IN | DIASTOLIC BLOOD PRESSURE: 94 MMHG | BODY MASS INDEX: 39.17 KG/M2 | RESPIRATION RATE: 22 BRPM | TEMPERATURE: 97.8 F

## 2023-10-18 DIAGNOSIS — T14.8XXA SKIN EXCORIATION: Primary | ICD-10-CM

## 2023-10-18 DIAGNOSIS — B37.2 SKIN YEAST INFECTION: ICD-10-CM

## 2023-10-18 PROCEDURE — 99283 EMERGENCY DEPT VISIT LOW MDM: CPT

## 2023-10-18 ASSESSMENT — PAIN DESCRIPTION - DESCRIPTORS: DESCRIPTORS: DISCOMFORT

## 2023-10-18 ASSESSMENT — PAIN DESCRIPTION - LOCATION: LOCATION: PENIS

## 2023-10-18 ASSESSMENT — PAIN SCALES - GENERAL: PAINLEVEL_OUTOF10: 7

## 2023-10-18 ASSESSMENT — PAIN - FUNCTIONAL ASSESSMENT: PAIN_FUNCTIONAL_ASSESSMENT: 0-10

## 2023-10-18 NOTE — ED TRIAGE NOTES
Pt states \"I think I broke my penis! \" Pt reports swelling and slight deformity that he noticed yesterday.

## 2023-10-18 NOTE — ED NOTES
Patient (s)  given copy of dc instructions and 1 script(s). Patient (s)  verbalized understanding of instructions and script (s). Patient given a current medication reconciliation form and verbalized understanding of their medications. Patient (s) verbalized understanding of the importance of discussing medications with his or her physician or clinic they will be following up with. Patient alert and oriented and in no acute distress. Patient discharged home ambulatory with self.        Anastacio Guerrero, MARLON  10/18/23 4062

## 2024-05-08 ENCOUNTER — APPOINTMENT (OUTPATIENT)
Facility: HOSPITAL | Age: 34
End: 2024-05-08
Payer: MEDICAID

## 2024-05-08 ENCOUNTER — HOSPITAL ENCOUNTER (EMERGENCY)
Facility: HOSPITAL | Age: 34
Discharge: HOME | End: 2024-05-08
Attending: STUDENT IN AN ORGANIZED HEALTH CARE EDUCATION/TRAINING PROGRAM
Payer: MEDICAID

## 2024-05-08 VITALS
BODY MASS INDEX: 39.17 KG/M2 | WEIGHT: 315 LBS | DIASTOLIC BLOOD PRESSURE: 93 MMHG | RESPIRATION RATE: 18 BRPM | OXYGEN SATURATION: 97 % | HEIGHT: 75 IN | TEMPERATURE: 98.3 F | SYSTOLIC BLOOD PRESSURE: 151 MMHG | HEART RATE: 98 BPM

## 2024-05-08 DIAGNOSIS — N45.1 EPIDIDYMITIS: Primary | ICD-10-CM

## 2024-05-08 LAB
ALBUMIN SERPL-MCNC: 3.3 G/DL (ref 3.5–5)
ALBUMIN/GLOB SERPL: 0.9 (ref 1.1–2.2)
ALP SERPL-CCNC: 102 U/L (ref 45–117)
ALT SERPL-CCNC: 23 U/L (ref 12–78)
ANION GAP SERPL CALC-SCNC: 11 MMOL/L (ref 5–15)
APPEARANCE UR: CLEAR
AST SERPL-CCNC: 11 U/L (ref 15–37)
BACTERIA URNS QL MICRO: NEGATIVE /HPF
BASOPHILS # BLD: 0 K/UL (ref 0–0.1)
BASOPHILS NFR BLD: 0 % (ref 0–1)
BILIRUB SERPL-MCNC: 0.6 MG/DL (ref 0.2–1)
BILIRUB UR QL: NEGATIVE
BUN SERPL-MCNC: 13 MG/DL (ref 6–20)
BUN/CREAT SERPL: 12 (ref 12–20)
CALCIUM SERPL-MCNC: 8.8 MG/DL (ref 8.5–10.1)
CHLORIDE SERPL-SCNC: 102 MMOL/L (ref 97–108)
CO2 SERPL-SCNC: 27 MMOL/L (ref 21–32)
COLOR UR: ABNORMAL
CREAT SERPL-MCNC: 1.06 MG/DL (ref 0.7–1.3)
DIFFERENTIAL METHOD BLD: ABNORMAL
EOSINOPHIL # BLD: 0.1 K/UL (ref 0–0.4)
EOSINOPHIL NFR BLD: 1 % (ref 0–7)
EPITH CASTS URNS QL MICRO: ABNORMAL /LPF
ERYTHROCYTE [DISTWIDTH] IN BLOOD BY AUTOMATED COUNT: 14.6 % (ref 11.5–14.5)
GLOBULIN SER CALC-MCNC: 3.8 G/DL (ref 2–4)
GLUCOSE SERPL-MCNC: 97 MG/DL (ref 65–100)
GLUCOSE UR STRIP.AUTO-MCNC: NEGATIVE MG/DL
HCT VFR BLD AUTO: 41.1 % (ref 36.6–50.3)
HGB BLD-MCNC: 13.8 G/DL (ref 12.1–17)
HGB UR QL STRIP: NEGATIVE
IMM GRANULOCYTES # BLD AUTO: 0.1 K/UL (ref 0–0.04)
IMM GRANULOCYTES NFR BLD AUTO: 1 % (ref 0–0.5)
INR PPP: 0.9 (ref 0.9–1.1)
KETONES UR QL STRIP.AUTO: ABNORMAL MG/DL
LACTATE SERPL-SCNC: 1.3 MMOL/L (ref 0.4–2)
LACTATE SERPL-SCNC: 1.8 MMOL/L (ref 0.4–2)
LEUKOCYTE ESTERASE UR QL STRIP.AUTO: ABNORMAL
LYMPHOCYTES # BLD: 1.7 K/UL (ref 0.8–3.5)
LYMPHOCYTES NFR BLD: 15 % (ref 12–49)
MCH RBC QN AUTO: 28.5 PG (ref 26–34)
MCHC RBC AUTO-ENTMCNC: 33.6 G/DL (ref 30–36.5)
MCV RBC AUTO: 84.9 FL (ref 80–99)
MONOCYTES # BLD: 1.1 K/UL (ref 0–1)
MONOCYTES NFR BLD: 10 % (ref 5–13)
NEUTS SEG # BLD: 8 K/UL (ref 1.8–8)
NEUTS SEG NFR BLD: 73 % (ref 32–75)
NITRITE UR QL STRIP.AUTO: NEGATIVE
NRBC # BLD: 0 K/UL (ref 0–0.01)
NRBC BLD-RTO: 0 PER 100 WBC
PH UR STRIP: 7 (ref 5–8)
PLATELET # BLD AUTO: 217 K/UL (ref 150–400)
PMV BLD AUTO: 10.3 FL (ref 8.9–12.9)
POTASSIUM SERPL-SCNC: 4.2 MMOL/L (ref 3.5–5.1)
PROT SERPL-MCNC: 7.1 G/DL (ref 6.4–8.2)
PROT UR STRIP-MCNC: NEGATIVE MG/DL
PROTHROMBIN TIME: 9.5 SEC (ref 9–11.1)
RBC # BLD AUTO: 4.84 M/UL (ref 4.1–5.7)
RBC #/AREA URNS HPF: ABNORMAL /HPF (ref 0–5)
SODIUM SERPL-SCNC: 140 MMOL/L (ref 136–145)
SP GR UR REFRACTOMETRY: 1.02 (ref 1–1.03)
URINE CULTURE IF INDICATED: ABNORMAL
UROBILINOGEN UR QL STRIP.AUTO: 1 EU/DL (ref 0.2–1)
WBC # BLD AUTO: 10.9 K/UL (ref 4.1–11.1)
WBC URNS QL MICRO: ABNORMAL /HPF (ref 0–4)

## 2024-05-08 PROCEDURE — 96375 TX/PRO/DX INJ NEW DRUG ADDON: CPT

## 2024-05-08 PROCEDURE — 83605 ASSAY OF LACTIC ACID: CPT

## 2024-05-08 PROCEDURE — 74177 CT ABD & PELVIS W/CONTRAST: CPT

## 2024-05-08 PROCEDURE — 96374 THER/PROPH/DIAG INJ IV PUSH: CPT

## 2024-05-08 PROCEDURE — 6360000004 HC RX CONTRAST MEDICATION: Performed by: STUDENT IN AN ORGANIZED HEALTH CARE EDUCATION/TRAINING PROGRAM

## 2024-05-08 PROCEDURE — 6360000002 HC RX W HCPCS: Performed by: STUDENT IN AN ORGANIZED HEALTH CARE EDUCATION/TRAINING PROGRAM

## 2024-05-08 PROCEDURE — 85610 PROTHROMBIN TIME: CPT

## 2024-05-08 PROCEDURE — 2580000003 HC RX 258: Performed by: STUDENT IN AN ORGANIZED HEALTH CARE EDUCATION/TRAINING PROGRAM

## 2024-05-08 PROCEDURE — 36415 COLL VENOUS BLD VENIPUNCTURE: CPT

## 2024-05-08 PROCEDURE — 81001 URINALYSIS AUTO W/SCOPE: CPT

## 2024-05-08 PROCEDURE — 76870 US EXAM SCROTUM: CPT

## 2024-05-08 PROCEDURE — 99285 EMERGENCY DEPT VISIT HI MDM: CPT

## 2024-05-08 PROCEDURE — 85025 COMPLETE CBC W/AUTO DIFF WBC: CPT

## 2024-05-08 PROCEDURE — 96376 TX/PRO/DX INJ SAME DRUG ADON: CPT

## 2024-05-08 PROCEDURE — 80053 COMPREHEN METABOLIC PANEL: CPT

## 2024-05-08 RX ORDER — MORPHINE SULFATE 4 MG/ML
4 INJECTION, SOLUTION INTRAMUSCULAR; INTRAVENOUS
Status: COMPLETED | OUTPATIENT
Start: 2024-05-08 | End: 2024-05-08

## 2024-05-08 RX ORDER — MORPHINE SULFATE 2 MG/ML
2 INJECTION, SOLUTION INTRAMUSCULAR; INTRAVENOUS
Status: COMPLETED | OUTPATIENT
Start: 2024-05-08 | End: 2024-05-08

## 2024-05-08 RX ORDER — OXYCODONE HYDROCHLORIDE AND ACETAMINOPHEN 5; 325 MG/1; MG/1
1 TABLET ORAL EVERY 6 HOURS PRN
Qty: 4 TABLET | Refills: 0 | Status: SHIPPED | OUTPATIENT
Start: 2024-05-08 | End: 2024-05-09

## 2024-05-08 RX ORDER — KETOROLAC TROMETHAMINE 30 MG/ML
15 INJECTION, SOLUTION INTRAMUSCULAR; INTRAVENOUS
Status: COMPLETED | OUTPATIENT
Start: 2024-05-08 | End: 2024-05-08

## 2024-05-08 RX ORDER — SULFAMETHOXAZOLE AND TRIMETHOPRIM 800; 160 MG/1; MG/1
1 TABLET ORAL 2 TIMES DAILY
Qty: 20 TABLET | Refills: 0 | Status: SHIPPED | OUTPATIENT
Start: 2024-05-08 | End: 2024-05-18

## 2024-05-08 RX ORDER — 0.9 % SODIUM CHLORIDE 0.9 %
1000 INTRAVENOUS SOLUTION INTRAVENOUS ONCE
Status: COMPLETED | OUTPATIENT
Start: 2024-05-08 | End: 2024-05-08

## 2024-05-08 RX ADMIN — SODIUM CHLORIDE 1000 ML: 9 INJECTION, SOLUTION INTRAVENOUS at 07:38

## 2024-05-08 RX ADMIN — IOPAMIDOL 100 ML: 755 INJECTION, SOLUTION INTRAVENOUS at 09:15

## 2024-05-08 RX ADMIN — MORPHINE SULFATE 4 MG: 4 INJECTION INTRAVENOUS at 07:37

## 2024-05-08 RX ADMIN — KETOROLAC TROMETHAMINE 15 MG: 30 INJECTION, SOLUTION INTRAMUSCULAR; INTRAVENOUS at 07:37

## 2024-05-08 RX ADMIN — MORPHINE SULFATE 2 MG: 2 INJECTION, SOLUTION INTRAMUSCULAR; INTRAVENOUS at 11:25

## 2024-05-08 ASSESSMENT — PAIN DESCRIPTION - ORIENTATION
ORIENTATION: RIGHT;LEFT
ORIENTATION: RIGHT

## 2024-05-08 ASSESSMENT — PAIN SCALES - GENERAL
PAINLEVEL_OUTOF10: 10

## 2024-05-08 ASSESSMENT — PAIN DESCRIPTION - LOCATION
LOCATION: SCROTUM
LOCATION: GROIN
LOCATION: GROIN

## 2024-05-08 ASSESSMENT — PAIN - FUNCTIONAL ASSESSMENT: PAIN_FUNCTIONAL_ASSESSMENT: 0-10

## 2024-05-08 ASSESSMENT — PAIN DESCRIPTION - DESCRIPTORS
DESCRIPTORS: SORE
DESCRIPTORS: ACHING;SHARP;SHOOTING;THROBBING
DESCRIPTORS: ACHING;DISCOMFORT;SHARP;THROBBING

## 2024-05-08 ASSESSMENT — PAIN DESCRIPTION - PAIN TYPE: TYPE: ACUTE PAIN

## 2024-05-08 ASSESSMENT — ENCOUNTER SYMPTOMS: ABDOMINAL PAIN: 1

## 2024-05-08 NOTE — DISCHARGE INSTRUCTIONS
Thank you for allowing me to take care of you in the ER today.  You were evaluated for emergent causes of your symptoms. You were diagnosed with an infection in your testicle called epididymitis. Please take the antibiotics as prescribed.  Please follow-up outpatient with your urology for reassessment within one week. If you develop any new or worsening symptoms you are always welcome to come back to the ER to be evaluated.

## 2024-05-08 NOTE — ED NOTES
Pt presents ambulatory to ED complaining of swollen groin due to lifting up his boat. Pt is alert and oriented x 4, RR even and unlabored, skin is warm and dry. Assesment completed and pt updated on plan of care.       Emergency Department Nursing Plan of Care       The Nursing Plan of Care is developed from the Nursing assessment and Emergency Department Attending provider initial evaluation.  The plan of care may be reviewed in the “ED Provider note”.    The Plan of Care was developed with the following considerations:   Patient / Family readiness to learn indicated by:verbalized understanding  Persons(s) to be included in education: patient  Barriers to Learning/Limitations:None    Signed     Louis Ding RN    5/8/2024   7:19 AM

## 2024-05-08 NOTE — ED PROVIDER NOTES
administration in time range)   sodium chloride 0.9 % bolus 1,000 mL (has no administration in time range)       CONSULTS: (Who and What was discussed)  None      Chronic Conditions: adhd    Social Determinants affecting Dx or Tx: None    Records Reviewed (source and summary of external notes): Nursing Notes, Old Medical Records, Previous Radiology Studies, and Previous Laboratory Studies    CC/HPI Summary, DDx, ED Course, and Reassessment: 34-year-old female who presents to the ER with right testicular pain after lifting 2 days ago.  Vital signs are stable.  He appears very uncomfortable.  Differential includes but not limited to right inguinal hernia into the testicle, incarcerated hernia, strangulated hernia, testicular torsion, varicocele, UTI, muscular strain.  Will evaluate with labs and CT imaging.  Will transfer as needed.     Patient's ultrasound consistent with epididymitis.  Pain is resolved.  Patient low risk for STI so will use bactrim. Discussed treatment plan and follow-up with patient and he verbalized understanding.  He understands return precautions.  Vital signs are stable.  Will discharge now    Disposition Considerations (Tests not done, Shared Decision Making, Pt Expectation of Test or Tx.):     FINAL IMPRESSION     1. Epididymitis          DISPOSITION/PLAN   DISPOSITION        Discharge Note: The patient is stable for discharge home. The signs, symptoms, diagnosis, and discharge instructions have been discussed, understanding conveyed, and agreed upon. The patient is to follow up as recommended or return to ER should their symptoms worsen.      PATIENT REFERRED TO:  No follow-up provider specified.       DISCHARGE MEDICATIONS:     Medication List        ASK your doctor about these medications      miconazole 2 % cream  Commonly known as: Miconazole Antifungal  Apply topically 2 times daily for 7 days.                DISCONTINUED MEDICATIONS:  Current Discharge Medication List          I am the

## 2024-05-08 NOTE — ED TRIAGE NOTES
Pt comes in with R groin/scrotal swelling x 3 days after lifting a speed boat to a hitch. Pt reports some dysuria; denies blood in urine.

## 2024-05-10 ENCOUNTER — HOSPITAL ENCOUNTER (EMERGENCY)
Facility: HOSPITAL | Age: 34
Discharge: HOME | End: 2024-05-10
Attending: EMERGENCY MEDICINE
Payer: MEDICAID

## 2024-05-10 ENCOUNTER — APPOINTMENT (OUTPATIENT)
Facility: HOSPITAL | Age: 34
End: 2024-05-10
Payer: MEDICAID

## 2024-05-10 VITALS
SYSTOLIC BLOOD PRESSURE: 123 MMHG | OXYGEN SATURATION: 98 % | TEMPERATURE: 98 F | BODY MASS INDEX: 40.43 KG/M2 | HEIGHT: 74 IN | RESPIRATION RATE: 18 BRPM | WEIGHT: 315 LBS | HEART RATE: 90 BPM | DIASTOLIC BLOOD PRESSURE: 72 MMHG

## 2024-05-10 DIAGNOSIS — N45.3 EPIDIDYMO-ORCHITIS WITHOUT ABSCESS: Primary | ICD-10-CM

## 2024-05-10 PROCEDURE — 6370000000 HC RX 637 (ALT 250 FOR IP): Performed by: EMERGENCY MEDICINE

## 2024-05-10 PROCEDURE — 6360000002 HC RX W HCPCS: Performed by: EMERGENCY MEDICINE

## 2024-05-10 PROCEDURE — 76870 US EXAM SCROTUM: CPT

## 2024-05-10 PROCEDURE — 96372 THER/PROPH/DIAG INJ SC/IM: CPT

## 2024-05-10 PROCEDURE — 99284 EMERGENCY DEPT VISIT MOD MDM: CPT

## 2024-05-10 RX ORDER — OXYCODONE HYDROCHLORIDE 5 MG/1
5 TABLET ORAL EVERY 6 HOURS PRN
Qty: 12 TABLET | Refills: 0 | Status: SHIPPED | OUTPATIENT
Start: 2024-05-10 | End: 2024-05-13

## 2024-05-10 RX ORDER — KETOROLAC TROMETHAMINE 30 MG/ML
30 INJECTION, SOLUTION INTRAMUSCULAR; INTRAVENOUS ONCE
Status: COMPLETED | OUTPATIENT
Start: 2024-05-10 | End: 2024-05-10

## 2024-05-10 RX ORDER — LEVOFLOXACIN 500 MG/1
500 TABLET, FILM COATED ORAL DAILY
Qty: 10 TABLET | Refills: 0 | Status: SHIPPED | OUTPATIENT
Start: 2024-05-10 | End: 2024-05-20

## 2024-05-10 RX ORDER — OXYCODONE HYDROCHLORIDE AND ACETAMINOPHEN 5; 325 MG/1; MG/1
1 TABLET ORAL
Status: COMPLETED | OUTPATIENT
Start: 2024-05-10 | End: 2024-05-10

## 2024-05-10 RX ADMIN — OXYCODONE AND ACETAMINOPHEN 1 TABLET: 5; 325 TABLET ORAL at 08:29

## 2024-05-10 RX ADMIN — KETOROLAC TROMETHAMINE 30 MG: 30 INJECTION, SOLUTION INTRAMUSCULAR; INTRAVENOUS at 08:28

## 2024-05-10 ASSESSMENT — PAIN DESCRIPTION - PAIN TYPE: TYPE: ACUTE PAIN

## 2024-05-10 ASSESSMENT — PAIN DESCRIPTION - DESCRIPTORS
DESCRIPTORS: ACHING
DESCRIPTORS: ACHING;THROBBING

## 2024-05-10 ASSESSMENT — PAIN SCALES - GENERAL
PAINLEVEL_OUTOF10: 10
PAINLEVEL_OUTOF10: 10

## 2024-05-10 ASSESSMENT — PAIN DESCRIPTION - ORIENTATION
ORIENTATION: LOWER
ORIENTATION: RIGHT

## 2024-05-10 ASSESSMENT — PAIN DESCRIPTION - LOCATION
LOCATION: SCROTUM
LOCATION: OTHER (COMMENT)

## 2024-05-10 ASSESSMENT — PAIN - FUNCTIONAL ASSESSMENT: PAIN_FUNCTIONAL_ASSESSMENT: 0-10

## 2024-05-10 NOTE — ED NOTES
Pt presents to ED ambulatory complaining of R groin/scrotal swelling and pain x 1 day.. Pt states he came to ED 2 days ago for same reason but received antibiotics and pain meds and states \"felt pain relief until completion of pain meds\". Pt reports some dysuria and diaphoresis; denies fever and blood in urine. Pt is alert and oriented x 4, RR even and unlabored, skin is warm and dry. Assessment completed and pt updated on plan of care.  Call bell in reach.        Emergency Department Nursing Plan of Care       The Nursing Plan of Care is developed from the Nursing assessment and Emergency Department Attending provider initial evaluation.  The plan of care may be reviewed in the “ED Provider note”.    The Plan of Care was developed with the following considerations:   Patient / Family readiness to learn indicated by:verbalized understanding  Persons(s) to be included in education: patient  Barriers to Learning/Limitations:None    Signed

## 2024-05-10 NOTE — ED PROVIDER NOTES
possible to manage pain Max Daily Amount: 20 mg            ASK your doctor about these medications      miconazole 2 % cream  Commonly known as: Miconazole Antifungal  Apply topically 2 times daily for 7 days.     sulfamethoxazole-trimethoprim 800-160 MG per tablet  Commonly known as: Bactrim DS  Take 1 tablet by mouth 2 times daily for 10 days               Where to Get Your Medications        These medications were sent to [address] -  [phone] -  [phone]  [address] [place] [address]      Phone: [phone]   levoFLOXacin 500 MG tablet  oxyCODONE 5 MG immediate release tablet           DISCONTINUED MEDICATIONS:  Discharge Medication List as of 5/10/2024 11:56 AM          I am the Primary Clinician of Record.   Dario Payan MD (electronically signed)    (Please note that parts of this dictation were completed with voice recognition software. Quite often unanticipated grammatical, syntax, homophones, and other interpretive errors are inadvertently transcribed by the computer software. Please disregards these errors. Please excuse any errors that have escaped final proofreading.)       Dario Payan MD  05/10/24 8793

## 2024-05-10 NOTE — ED NOTES
Patient has been instructed that they have been given OxyCODONE - acetaminophen (Percocet) which contains opioids, benzodiazepines, or other sedating drugs. Patient is aware that they will need to refrain from driving or operating heavy machinery after taking this medication. Patient also instructed that they need to avoid drinking alcohol and using other products containing opioids, benzodiazepines, or other sedating drugs. Patient verbalized understanding.

## 2024-05-10 NOTE — ED NOTES
Discharge instructions were given to the patient by Yunior Jones RN  .  The patient left the Emergency Department alert and oriented and in no acute distress with 2 prescription(s). The patient was encouraged to call or return to the ED for worsening issues or problems and was encouraged to schedule a follow up appointment for continuing care.  The patient verbalized understanding of discharge instructions and prescriptions; all questions were answered. The patient has no further concerns at this time.      Patient has been instructed that they have been given oxycodone* which contains opioids, benzodiazepines, or other sedating drugs. Patient is aware that they will need to refrain from driving or operating heavy machinery after taking this medication.  Patient also instructed that they need to avoid drinking alcohol and using other products containing opioids, benzodiazepines, or other sedating drugs.  Patient verbalized understanding.

## 2024-05-10 NOTE — DISCHARGE INSTRUCTIONS
You were evaluated in the emergency department for worsening pain and swelling to the right testicle, this is consistent with epididymoorchitis which is an infection.  Please take the Levaquin and Bactrim as directed until completion.  It will be important for you to follow-up with urology in 2 to 3 days, please call to make an appointment.  If you develop worsening symptoms such as fevers, worsening pain, swelling, redness despite antibiotic use, please return to the emergency department immediately.

## 2024-10-16 ENCOUNTER — HOSPITAL ENCOUNTER (EMERGENCY)
Facility: HOSPITAL | Age: 34
Discharge: LEFT BEFORE DISCHARGE | End: 2024-10-16

## 2024-10-19 ENCOUNTER — HOSPITAL ENCOUNTER (EMERGENCY)
Facility: HOSPITAL | Age: 34
Discharge: HOME | End: 2024-10-19
Attending: EMERGENCY MEDICINE
Payer: MEDICAID

## 2024-10-19 VITALS
WEIGHT: 315 LBS | SYSTOLIC BLOOD PRESSURE: 124 MMHG | TEMPERATURE: 98.5 F | DIASTOLIC BLOOD PRESSURE: 77 MMHG | HEART RATE: 93 BPM | RESPIRATION RATE: 16 BRPM | BODY MASS INDEX: 43.65 KG/M2 | OXYGEN SATURATION: 99 %

## 2024-10-19 DIAGNOSIS — N48.1 BALANITIS: Primary | ICD-10-CM

## 2024-10-19 LAB
APPEARANCE UR: CLEAR
BACTERIA URNS QL MICRO: NEGATIVE /HPF
BILIRUB UR QL: NEGATIVE
COLOR UR: ABNORMAL
EPITH CASTS URNS QL MICRO: ABNORMAL /LPF
GLUCOSE BLD STRIP.AUTO-MCNC: 91 MG/DL (ref 65–117)
GLUCOSE UR STRIP.AUTO-MCNC: NEGATIVE MG/DL
HGB UR QL STRIP: NEGATIVE
KETONES UR QL STRIP.AUTO: NEGATIVE MG/DL
LEUKOCYTE ESTERASE UR QL STRIP.AUTO: ABNORMAL
NITRITE UR QL STRIP.AUTO: NEGATIVE
PH UR STRIP: 5.5 (ref 5–8)
PROT UR STRIP-MCNC: NEGATIVE MG/DL
RBC #/AREA URNS HPF: ABNORMAL /HPF (ref 0–5)
SERVICE CMNT-IMP: NORMAL
SP GR UR REFRACTOMETRY: 1.02
URINE CULTURE IF INDICATED: ABNORMAL
UROBILINOGEN UR QL STRIP.AUTO: 0.2 EU/DL (ref 0.2–1)
WBC URNS QL MICRO: ABNORMAL /HPF (ref 0–4)

## 2024-10-19 PROCEDURE — 87491 CHLMYD TRACH DNA AMP PROBE: CPT

## 2024-10-19 PROCEDURE — 81001 URINALYSIS AUTO W/SCOPE: CPT

## 2024-10-19 PROCEDURE — 87591 N.GONORRHOEAE DNA AMP PROB: CPT

## 2024-10-19 PROCEDURE — 99283 EMERGENCY DEPT VISIT LOW MDM: CPT

## 2024-10-19 PROCEDURE — 87255 GENET VIRUS ISOLATE HSV: CPT

## 2024-10-19 PROCEDURE — 82962 GLUCOSE BLOOD TEST: CPT

## 2024-10-19 RX ORDER — CLOTRIMAZOLE 1 %
CREAM (GRAM) TOPICAL
Qty: 28 G | Refills: 1 | Status: SHIPPED | OUTPATIENT
Start: 2024-10-19 | End: 2024-10-26

## 2024-10-19 ASSESSMENT — PAIN - FUNCTIONAL ASSESSMENT: PAIN_FUNCTIONAL_ASSESSMENT: NONE - DENIES PAIN

## 2024-10-19 NOTE — ED PROVIDER NOTES
[facility] EMERGENCY DEPT  EMERGENCY DEPARTMENT ENCOUNTER       Pt Name: Leander Lange  MRN: [id number]  Birthdate 1990  Date of evaluation: 10/19/2024  Provider: Claire Moncada MD   PCP: No primary care provider on file.  Note Started: 10:19 AM EDT 10/19/24     CHIEF COMPLAINT       Chief Complaint   Patient presents with    Urinary Frequency     Pt reports that his GF has a UTI and since then he has had swelling and scratches to his penis.  He reports that the last time he was diagnosed with a UTI and treated for a UTI.  Denies discharge.          HISTORY OF PRESENT ILLNESS: 1 or more elements      History From: patient, History limited by: none     Leander Lange is a 34 y.o. male who presents with a cc of penile irritation       Please See MDM for Additional Details of the HPI/PMH  Nursing Notes were all reviewed and agreed with or any disagreements were addressed in the HPI.     REVIEW OF SYSTEMS        Positives and Pertinent negatives as per HPI.    PAST HISTORY     Past Medical History:  Past Medical History:   Diagnosis Date    Other ill-defined conditions(799.89)     ADHD       Past Surgical History:  Past Surgical History:   Procedure Laterality Date    APPENDECTOMY      [place] UNLISTED PROCEDURE ABDOMEN PERITONEUM & OMENTUM      appendectomy       Family History:  No family history on file.    Social History:  Social History     Tobacco Use    Smoking status: Every Day     Current packs/day: 1.00     Types: Cigarettes    Smokeless tobacco: Never   Vaping Use    Vaping status: Never Used   Substance Use Topics    Alcohol use: Yes     Comment: occ    Drug use: Yes     Types: Marijuana (Weed)       Allergies:  Allergies   Allergen Reactions    Amoxicillin Hives    Adhesive Tape Rash       CURRENT MEDICATIONS      Previous Medications    MICONAZOLE (MICONAZOLE ANTIFUNGAL) 2 % CREAM    Apply topically 2 times daily for 7 days.       SCREENINGS               No data recorded         PHYSICAL EXAM      ED Triage

## 2024-10-19 NOTE — ED NOTES
Discharge instructions were given to the patient by MARLON Purcell.     The patient left the Emergency Department alert and oriented and in no acute distress with 1 prescriptions. The patient was encouraged to call or return to the ED for worsening issues or problems and was encouraged to schedule a follow up appointment for continuing care.     Ambulation assessment completed before discharge.  Pt left Emergency Department ambulating at baseline with no ortho devices  Ortho device education: none    The patient verbalized understanding of discharge instructions and prescriptions, all questions were answered. The patient has no further concerns at this time.

## 2024-10-19 NOTE — ED NOTES
Pt presents to ED complaining of urinary frequency and states he has swelling and scratches to his penis. Pt reports his GF has a UTI and since then he has had sx. Pt reports he was diagnosed and treated for a UTI the last time he was seen. Pt denies any dysuria, urgency, or discharge. Pt is alert and oriented x 4, RR even and unlabored, skin is warm and dry. Pt appears in NAD at this time. Assessment completed and pt updated on plan of care.  Call bell in reach.    Emergency Department Nursing Plan of Care  The Nursing Plan of Care is developed from the Nursing assessment and Emergency Department Attending provider initial evaluation.  The plan of care may be reviewed in the “ED Provider note”.      The Plan of Care was developed with the following considerations:  Patient / Family readiness to learn indicated by:Refer to Medical chart in [facility]  Persons(s) to be included in education: Refer to Medical chart in [facility]  Barriers to Learning/Limitations:Normal     Signed    Jazzy Henley RN  10/19/2024 10:35 AM

## 2024-10-21 LAB
C TRACH DNA SPEC QL NAA+PROBE: NEGATIVE
N GONORRHOEA DNA SPEC QL NAA+PROBE: NEGATIVE
SAMPLE TYPE: NORMAL
SERVICE CMNT-IMP: NORMAL
SPECIMEN SOURCE: NORMAL

## 2024-10-22 LAB
HSV SPEC CULT: NORMAL
SPECIMEN SOURCE: NORMAL

## 2024-11-26 ENCOUNTER — HOSPITAL ENCOUNTER (EMERGENCY)
Facility: HOSPITAL | Age: 34
Discharge: HOME | End: 2024-11-26
Attending: EMERGENCY MEDICINE
Payer: MEDICAID

## 2024-11-26 ENCOUNTER — APPOINTMENT (OUTPATIENT)
Facility: HOSPITAL | Age: 34
End: 2024-11-26
Payer: MEDICAID

## 2024-11-26 VITALS
WEIGHT: 250 LBS | DIASTOLIC BLOOD PRESSURE: 95 MMHG | SYSTOLIC BLOOD PRESSURE: 135 MMHG | RESPIRATION RATE: 18 BRPM | HEIGHT: 75 IN | BODY MASS INDEX: 31.08 KG/M2 | TEMPERATURE: 98.3 F | HEART RATE: 91 BPM | OXYGEN SATURATION: 97 %

## 2024-11-26 DIAGNOSIS — S62.357A NONDISPLACED FRACTURE OF SHAFT OF FIFTH METACARPAL BONE, LEFT HAND, INITIAL ENCOUNTER FOR CLOSED FRACTURE: Primary | ICD-10-CM

## 2024-11-26 DIAGNOSIS — Z72.0 TOBACCO ABUSE: ICD-10-CM
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
## 2024-11-26 PROCEDURE — 6370000000 HC RX 637 (ALT 250 FOR IP): Performed by: EMERGENCY MEDICINE

## 2024-11-26 PROCEDURE — 73130 X-RAY EXAM OF HAND: CPT

## 2024-11-26 PROCEDURE — 99283 EMERGENCY DEPT VISIT LOW MDM: CPT

## 2024-11-26 PROCEDURE — 29125 APPL SHORT ARM SPLINT STATIC: CPT

## 2024-11-26 RX ORDER — OXYCODONE AND ACETAMINOPHEN 5; 325 MG/1; MG/1
1 TABLET ORAL
Status: COMPLETED | OUTPATIENT
Start: 2024-11-26 | End: 2024-11-26

## 2024-11-26 RX ORDER — OXYCODONE AND ACETAMINOPHEN 5; 325 MG/1; MG/1
1 TABLET ORAL EVERY 6 HOURS PRN
Qty: 12 TABLET | Refills: 0 | Status: SHIPPED | OUTPATIENT
Start: 2024-11-26 | End: 2024-11-29

## 2024-11-26 RX ORDER — OXYCODONE AND ACETAMINOPHEN 5; 325 MG/1; MG/1
1 TABLET ORAL
Status: DISCONTINUED | OUTPATIENT
Start: 2024-11-26 | End: 2024-11-26

## 2024-11-26 RX ORDER — NAPROXEN 250 MG/1
500 TABLET ORAL ONCE
Status: COMPLETED | OUTPATIENT
Start: 2024-11-26 | End: 2024-11-26

## 2024-11-26 RX ORDER — NAPROXEN 500 MG/1
500 TABLET ORAL 2 TIMES DAILY
Qty: 60 TABLET | Refills: 0 | Status: SHIPPED | OUTPATIENT
Start: 2024-11-26

## 2024-11-26 RX ADMIN — OXYCODONE HYDROCHLORIDE AND ACETAMINOPHEN 1 TABLET: 5; 325 TABLET ORAL at 07:31

## 2024-11-26 RX ADMIN — NAPROXEN 500 MG: 250 TABLET ORAL at 07:19

## 2024-11-26 ASSESSMENT — PAIN DESCRIPTION - PAIN TYPE: TYPE: ACUTE PAIN

## 2024-11-26 ASSESSMENT — PAIN SCALES - GENERAL
PAINLEVEL_OUTOF10: 8
PAINLEVEL_OUTOF10: 10

## 2024-11-26 ASSESSMENT — ENCOUNTER SYMPTOMS
COUGH: 0
EYE PAIN: 0
ABDOMINAL PAIN: 0
SHORTNESS OF BREATH: 0
NAUSEA: 0
VOMITING: 0
SORE THROAT: 0
DIARRHEA: 0
RHINORRHEA: 0

## 2024-11-26 ASSESSMENT — PAIN DESCRIPTION - ORIENTATION
ORIENTATION: LEFT
ORIENTATION: LEFT

## 2024-11-26 ASSESSMENT — PAIN DESCRIPTION - DESCRIPTORS: DESCRIPTORS: ACHING

## 2024-11-26 ASSESSMENT — PAIN - FUNCTIONAL ASSESSMENT: PAIN_FUNCTIONAL_ASSESSMENT: 0-10

## 2024-11-26 ASSESSMENT — PAIN DESCRIPTION - LOCATION
LOCATION: HAND
LOCATION: HAND

## 2024-11-26 NOTE — ED NOTES
See triage note. Pt is alert and oriented x 4, RR even and unlabored, skin is warm and dry. Assessment completed and pt updated on plan of care.  Call bell in reach.        Emergency Department Nursing Plan of Care       The Nursing Plan of Care is developed from the Nursing assessment and Emergency Department Attending provider initial evaluation.  The plan of care may be reviewed in the “ED Provider note”.    The Plan of Care was developed with the following considerations:   Patient / Family readiness to learn indicated by:verbalized understanding  Persons(s) to be included in education: patient  Barriers to Learning/Limitations:None    Signed
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
## 2024-11-26 NOTE — ED NOTES
Discharge instructions were given to the patient by Brianna MASON. The patient left the Emergency Department ambulatory, alert and oriented and in no acute distress with 2 prescriptions. The patient was encouraged to call or return to the ED for worsening issues or problems and was encouraged to schedule a follow up appointment for continuing care. The patient verbalized understanding of discharge instructions and prescriptions, all questions were answered. The patient has no further concerns at this time.     Patient has been instructed that they have been given percocet, at discharge, which contains opioids, benzodiazepines, or other sedating drugs. Patient is aware that they will need to refrain from driving or operating heavy machinery after taking this medication. Patient also instructed that they need to avoid drinking alcohol and using other products containing opioids, benzodiazepines, or other sedating drugs. Patient verbalized understanding.
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
## 2024-11-26 NOTE — ED NOTES
Verbal shift change report given to Brianna RN (oncoming nurse) by Duyen RN (offgoing nurse). Report included the following information Nurse Handoff Report, ED SBAR, Adult Overview, and MAR.

## 2024-12-05 ENCOUNTER — OFFICE VISIT (OUTPATIENT)
Age: 34
End: 2024-12-05
Payer: MEDICAID

## 2024-12-05 VITALS — HEIGHT: 75 IN | BODY MASS INDEX: 31.25 KG/M2

## 2024-12-05 DIAGNOSIS — S62.337A CLOSED FRACTURE OF NECK OF FIFTH METACARPAL BONE OF LEFT HAND, INITIAL ENCOUNTER: Primary | ICD-10-CM

## 2024-12-05 PROCEDURE — 99203 OFFICE O/P NEW LOW 30 MIN: CPT | Performed by: ORTHOPAEDIC SURGERY

## 2024-12-05 ASSESSMENT — PATIENT HEALTH QUESTIONNAIRE - PHQ9
SUM OF ALL RESPONSES TO PHQ QUESTIONS 1-9: 0
1. LITTLE INTEREST OR PLEASURE IN DOING THINGS: NOT AT ALL
SUM OF ALL RESPONSES TO PHQ QUESTIONS 1-9: 0
SUM OF ALL RESPONSES TO PHQ9 QUESTIONS 1 & 2: 0
2. FEELING DOWN, DEPRESSED OR HOPELESS: NOT AT ALL

## 2024-12-05 NOTE — PROGRESS NOTES
Identified pt with two pt identifiers (name and ). Reviewed chart in preparation for visit and have obtained necessary documentation.    Leander Lange is a 34 y.o. male Pain (Left wrist )  .    Vitals:    24 1449   Height: 1.905 m (6' 3\")          1. Have you been to the ER, urgent care clinic since your last visit?  Hospitalized since your last visit?  no     2. Have you seen or consulted any other health care providers outside of the [facility] since your last visit?  Include any pap smears or colon screening.  no  
is limited secondary to pain, but the range of motion of the remaining fingers is unrestricted.  Sensation is intact to light touch in the Median, Radial, and Ulnar nerve distributions.  Capillary refill is less than 2 seconds in the fingers.  Wrist and elbow have no restriction to range of motion, no tenderness, and no other deformity or positive findings.    Right upper extremity:  Full active and passive range of motion without pain, deformity, no open wound, strength 5/5 in all major muscle groups.    Right lower extremity: Full active and passive range of motion without pain, deformity, no open wound, strength 5/5 in all major muscle groups.    Left lower extremity:  Full active and passive range of motion without pain, deformity, no open wound, strength 5/5 in all major muscle groups.      Diagnostics:    Pertinent Xrays:  Xrays are available of the left hand which indicate a displaced left fifth metacarpal neck fracture with 30 degrees of angulation.  No other fractures, dislocation, or osseus abnormalities.      Assessment: Left hand boxer's fracture    Plan:    This patient has a boxer's fracture which is in acceptable alignment, non-operative management is the treatment of choice. We will place the patient in a boxer's brace for DME.  I did discuss that the injury, as well as prolonged immobilization, can cause stiffness and dysfunction, skin issues, small finger crossover, and that any fracture can cause persistent pain, stiffness, swelling, and residual symptoms.    The patient will remain in immobilization for three weeks.  After those three weeks, we will review updated films and potentially remove restriction and potentially begin physical therapy exercises, if necessary.   I did explain to the patient that the deformity may persist, but functional outcomes are largely unaffected by this level of displacement.      Patient is to return in 3 weeks for repeat x-rays of the left hand out of

## 2024-12-13 NOTE — ED NOTES
Pt received discharge instructions and verbalized understanding. He left ambulatory in no acute distress. 13-Dec-2024 21:25 12-Dec-2024 21:25

## 2024-12-16 ENCOUNTER — APPOINTMENT (OUTPATIENT)
Facility: HOSPITAL | Age: 34
End: 2024-12-16
Payer: MEDICAID

## 2024-12-16 ENCOUNTER — HOSPITAL ENCOUNTER (EMERGENCY)
Facility: HOSPITAL | Age: 34
Discharge: HOME | End: 2024-12-16
Attending: STUDENT IN AN ORGANIZED HEALTH CARE EDUCATION/TRAINING PROGRAM
Payer: MEDICAID

## 2024-12-16 VITALS
OXYGEN SATURATION: 98 % | DIASTOLIC BLOOD PRESSURE: 126 MMHG | RESPIRATION RATE: 18 BRPM | TEMPERATURE: 98.3 F | WEIGHT: 315 LBS | HEART RATE: 99 BPM | SYSTOLIC BLOOD PRESSURE: 140 MMHG | HEIGHT: 74 IN | BODY MASS INDEX: 40.43 KG/M2

## 2024-12-16 DIAGNOSIS — S16.1XXA STRAIN OF NECK MUSCLE, INITIAL ENCOUNTER: Primary | ICD-10-CM

## 2024-12-16 DIAGNOSIS — K29.90 GASTRITIS AND DUODENITIS: ICD-10-CM

## 2024-12-16 DIAGNOSIS — F10.10 ALCOHOL ABUSE: ICD-10-CM

## 2024-12-16 LAB
ALBUMIN SERPL-MCNC: 3.9 G/DL (ref 3.5–5)
ALBUMIN/GLOB SERPL: 1.1 (ref 1.1–2.2)
ALP SERPL-CCNC: 101 U/L (ref 45–117)
ALT SERPL-CCNC: 67 U/L (ref 12–78)
ANION GAP SERPL CALC-SCNC: 16 MMOL/L (ref 2–12)
AST SERPL-CCNC: 50 U/L (ref 15–37)
BASOPHILS # BLD: 0 K/UL (ref 0–0.1)
BASOPHILS NFR BLD: 1 % (ref 0–1)
BILIRUB SERPL-MCNC: 1.3 MG/DL (ref 0.2–1)
BUN SERPL-MCNC: 11 MG/DL (ref 6–20)
BUN/CREAT SERPL: 12 (ref 12–20)
CALCIUM SERPL-MCNC: 9.1 MG/DL (ref 8.5–10.1)
CHLORIDE SERPL-SCNC: 100 MMOL/L (ref 97–108)
CO2 SERPL-SCNC: 26 MMOL/L (ref 21–32)
CREAT SERPL-MCNC: 0.91 MG/DL (ref 0.7–1.3)
DIFFERENTIAL METHOD BLD: ABNORMAL
EOSINOPHIL # BLD: 0 K/UL (ref 0–0.4)
EOSINOPHIL NFR BLD: 1 % (ref 0–7)
ERYTHROCYTE [DISTWIDTH] IN BLOOD BY AUTOMATED COUNT: 15.9 % (ref 11.5–14.5)
GLOBULIN SER CALC-MCNC: 3.6 G/DL (ref 2–4)
GLUCOSE SERPL-MCNC: 88 MG/DL (ref 65–100)
HCT VFR BLD AUTO: 44.8 % (ref 36.6–50.3)
HGB BLD-MCNC: 15.1 G/DL (ref 12.1–17)
IMM GRANULOCYTES # BLD AUTO: 0 K/UL (ref 0–0.04)
IMM GRANULOCYTES NFR BLD AUTO: 1 % (ref 0–0.5)
LIPASE SERPL-CCNC: 27 U/L (ref 13–75)
LYMPHOCYTES # BLD: 1.3 K/UL (ref 0.8–3.5)
LYMPHOCYTES NFR BLD: 30 % (ref 12–49)
MCH RBC QN AUTO: 28 PG (ref 26–34)
MCHC RBC AUTO-ENTMCNC: 33.7 G/DL (ref 30–36.5)
MCV RBC AUTO: 83.1 FL (ref 80–99)
MONOCYTES # BLD: 0.4 K/UL (ref 0–1)
MONOCYTES NFR BLD: 9 % (ref 5–13)
NEUTS SEG # BLD: 2.4 K/UL (ref 1.8–8)
NEUTS SEG NFR BLD: 58 % (ref 32–75)
NRBC # BLD: 0 K/UL (ref 0–0.01)
NRBC BLD-RTO: 0 PER 100 WBC
PLATELET # BLD AUTO: 267 K/UL (ref 150–400)
PMV BLD AUTO: 10 FL (ref 8.9–12.9)
POTASSIUM SERPL-SCNC: 3.9 MMOL/L (ref 3.5–5.1)
PROT SERPL-MCNC: 7.5 G/DL (ref 6.4–8.2)
RBC # BLD AUTO: 5.39 M/UL (ref 4.1–5.7)
SODIUM SERPL-SCNC: 142 MMOL/L (ref 136–145)
WBC # BLD AUTO: 4.1 K/UL (ref 4.1–11.1)

## 2024-12-16 PROCEDURE — 80053 COMPREHEN METABOLIC PANEL: CPT

## 2024-12-16 PROCEDURE — 6370000000 HC RX 637 (ALT 250 FOR IP): Performed by: STUDENT IN AN ORGANIZED HEALTH CARE EDUCATION/TRAINING PROGRAM

## 2024-12-16 PROCEDURE — 74177 CT ABD & PELVIS W/CONTRAST: CPT

## 2024-12-16 PROCEDURE — 85025 COMPLETE CBC W/AUTO DIFF WBC: CPT

## 2024-12-16 PROCEDURE — 6360000004 HC RX CONTRAST MEDICATION: Performed by: STUDENT IN AN ORGANIZED HEALTH CARE EDUCATION/TRAINING PROGRAM

## 2024-12-16 PROCEDURE — 83690 ASSAY OF LIPASE: CPT

## 2024-12-16 PROCEDURE — 99285 EMERGENCY DEPT VISIT HI MDM: CPT

## 2024-12-16 PROCEDURE — 36415 COLL VENOUS BLD VENIPUNCTURE: CPT

## 2024-12-16 RX ORDER — DIAZEPAM 5 MG/1
5 TABLET ORAL ONCE
Status: COMPLETED | OUTPATIENT
Start: 2024-12-16 | End: 2024-12-16

## 2024-12-16 RX ORDER — IOPAMIDOL 755 MG/ML
100 INJECTION, SOLUTION INTRAVASCULAR
Status: COMPLETED | OUTPATIENT
Start: 2024-12-16 | End: 2024-12-16

## 2024-12-16 RX ORDER — PANTOPRAZOLE SODIUM 40 MG/1
40 TABLET, DELAYED RELEASE ORAL
Qty: 30 TABLET | Refills: 5 | Status: SHIPPED | OUTPATIENT
Start: 2024-12-16

## 2024-12-16 RX ADMIN — IOPAMIDOL 100 ML: 755 INJECTION, SOLUTION INTRAVENOUS at 11:02

## 2024-12-16 RX ADMIN — DIAZEPAM 5 MG: 5 TABLET ORAL at 10:37

## 2024-12-16 ASSESSMENT — PAIN DESCRIPTION - LOCATION: LOCATION: NECK

## 2024-12-16 ASSESSMENT — PAIN - FUNCTIONAL ASSESSMENT: PAIN_FUNCTIONAL_ASSESSMENT: 0-10

## 2024-12-16 ASSESSMENT — PAIN SCALES - GENERAL: PAINLEVEL_OUTOF10: 10

## 2024-12-16 ASSESSMENT — PAIN DESCRIPTION - DESCRIPTORS: DESCRIPTORS: SHOOTING;SHARP

## 2024-12-16 ASSESSMENT — PAIN DESCRIPTION - ORIENTATION: ORIENTATION: RIGHT

## 2024-12-16 NOTE — PROGRESS NOTES
[employer] assessment completed, and suicide risk level noted to be moderate. Primary Nurse Tejas and Physician Dr Armendariz notified. Concerns not observed.  Security/Off- has not been notified.

## 2024-12-16 NOTE — PROGRESS NOTES
Spiritual Care Partner Volunteer visited patient at [facility] in [facility] EMERGENCY DEPT on 12/16/2024   Documented by:  [facility]  To contact the  call: [phone])

## 2024-12-16 NOTE — ED NOTES
Pt presents to ED via home complaining of intermittent birght red and dark red rectal bleeding x 1 year. Pt also reports abdominal cramping x 2 days. Pt reports family hx of pancreatic cancer. Pt also reports right neck pain that radiates to the superior head. Pt states he has hx od depression and is having a hard time being here fearing what the results might be. Pt is alert and oriented x 4, RR even and unlabored, skin is warm and dry. Pt appears in NAD at this time. Assessment completed and pt updated on plan of care.  Call bell in reach.     Emergency Department Nursing Plan of Care  The Nursing Plan of Care is developed from the Nursing assessment and Emergency Department Attending provider initial evaluation.  The plan of care may be reviewed in the “ED Provider note”.  The Plan of Care was developed with the following considerations:  Patient / Family readiness to learn indicated by:Refer to Medical chart in [facility]  Persons(s) to be included in education: Refer to Medical chart in [facility]  Barriers to Learning/Limitations:Normal

## 2024-12-16 NOTE — ED PROVIDER NOTES
[facility] EMERGENCY DEPT  EMERGENCY DEPARTMENT ENCOUNTER       Pt Name: Leander Lange  MRN: [id number]  Birthdate 1990  Date of evaluation: 12/16/2024  Provider: Edin Armendariz MD   PCP: No primary care provider on file.  Note Started: 9:57 AM EST 12/16/24     CHIEF COMPLAINT       Chief Complaint   Patient presents with    Rectal Bleeding    Neck Pain    Abdominal Pain        HISTORY OF PRESENT ILLNESS: 1 or more elements      History From: patient, History limited by: none     Leander Lange is a 34 y.o. male presenting with neck pain, hematochezia, depression.  He notes he has had on and off bloody stools for several months.  Worse when he drinks alcohol.  He notes occasional right neck pain when he moves his head a certain way.  It shoots up into his scalp and right shoulder.  Denies any pain at the moment.  Notes some occasional abdominal pain in the suprapubic region.  Notes extensive depression and anxiety about this condition as he has a history of pancreatic cancer in his family and he is very scared about this.  Denies any attempts at hurting himself.  No  or .       Please See MDM for Additional Details of the HPI/PMH  Nursing Notes were all reviewed and agreed with or any disagreements were addressed in the HPI.     REVIEW OF SYSTEMS        Positives and Pertinent negatives as per HPI.    PAST HISTORY     Past Medical History:  Past Medical History:   Diagnosis Date    Other ill-defined conditions(799.89)     ADHD       Past Surgical History:  Past Surgical History:   Procedure Laterality Date    APPENDECTOMY      [place] UNLISTED PROCEDURE ABDOMEN PERITONEUM & OMENTUM      appendectomy       Family History:  No family history on file.    Social History:  Social History     Tobacco Use    Smoking status: Every Day     Current packs/day: 1.00     Types: Cigarettes    Smokeless tobacco: Never   Vaping Use    Vaping status: Never Used   Substance Use Topics    Alcohol use: Yes     Comment: occ    Drug use:

## 2024-12-16 NOTE — ED NOTES
Discharge instructions were given to the patient by JOSE L HALE RN.     The patient left the Emergency Department alert and oriented and in no acute distress with 1 prescriptions. The patient was encouraged to call or return to the ED for worsening issues or problems and was encouraged to schedule a follow up appointment for continuing care.     Ambulation assessment completed before discharge.  Pt left Emergency Department ambulating at baseline with no ortho devices  Ortho device education: none    The patient verbalized understanding of discharge instructions and prescriptions, all questions were answered. The patient has no further concerns at this time.

## 2024-12-16 NOTE — ED TRIAGE NOTES
Pt presents with rectal bleeding that shows bright and dark red blood x 2 days, and sharp, intermittent right neck pain that originates in his right trapezius, and radiates to his superior head. Pt states his uncle has a hx of pancreatic cancer, and endorses depression, hair loss, and denies SI/HI.

## 2024-12-16 NOTE — BSMART NOTE
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  Medical doctor is in agreement with this counselor's assessment and plan of care.  The plan is to discharge and follow up with [facility] later today.  The provider consulted was Dr. Armendariz.  The admitting Psychiatrist will be Dr. GRANGER/ronel.  The admitting Diagnosis is n/a.  The Payor source is [facility] Suicide Scale - This writer reviewed the [place] Suicide Severity Rating Scale in nursing flow sheet and the risk level assigned is moderate. Based on this assessment, the risk of suicide is moderate and the plan is to discharge and follow up with [facility].      Section II - Integrated Summary  Summary:     Per triage/provider:  Patient is a 34 male who arrives at ED via girlfriend with chief complaint of rectal bleeding that shows bright and dark red blood x 2 days, and sharp, intermittent right neck pain that originates in his right trapezius, and radiates to his superior head. Pt states his uncle has a hx of pancreatic cancer, and endorses depression, hair loss, and denies SI/HI.      Patient presents depressed, anxious, and restless. He reports drinking a fifth of Vodka 2 days ago \"because I was worried about having cancer.\" He states that his uncle continues to struggle with pancreatic cancer and patient is \"always wondering if I have it too.\" Patient reports drinking a fifth of Vodka \"about every other day for the past year or so.\" He denies ever having withdrawals or seizures. He has never participated in a SA program and is not in services with any [facility] or out patient provider for substance abuse or mental health problems. However, he is interested in the [facility] program at [facility] for substance abuse. This writer

## 2025-06-16 ENCOUNTER — APPOINTMENT (OUTPATIENT)
Facility: HOSPITAL | Age: 35
End: 2025-06-16
Payer: MEDICAID

## 2025-06-16 ENCOUNTER — HOSPITAL ENCOUNTER (EMERGENCY)
Facility: HOSPITAL | Age: 35
Discharge: LEFT BEFORE DISCHARGE | End: 2025-06-16
Attending: EMERGENCY MEDICINE
Payer: MEDICAID

## 2025-06-16 VITALS
HEIGHT: 75 IN | SYSTOLIC BLOOD PRESSURE: 134 MMHG | OXYGEN SATURATION: 95 % | WEIGHT: 315 LBS | HEART RATE: 80 BPM | TEMPERATURE: 97.7 F | RESPIRATION RATE: 20 BRPM | BODY MASS INDEX: 39.17 KG/M2 | DIASTOLIC BLOOD PRESSURE: 96 MMHG

## 2025-06-16 DIAGNOSIS — R10.9 ABDOMINAL PAIN, UNSPECIFIED ABDOMINAL LOCATION: Primary | ICD-10-CM

## 2025-06-16 LAB
ALBUMIN SERPL-MCNC: 3.4 G/DL (ref 3.5–5)
ALBUMIN/GLOB SERPL: 0.9 (ref 1.1–2.2)
ALP SERPL-CCNC: 99 U/L (ref 45–117)
ALT SERPL-CCNC: 53 U/L (ref 12–78)
ANION GAP SERPL CALC-SCNC: 5 MMOL/L (ref 2–12)
AST SERPL-CCNC: 41 U/L (ref 15–37)
BASOPHILS # BLD: 0.02 K/UL (ref 0–0.1)
BASOPHILS NFR BLD: 0.5 % (ref 0–1)
BILIRUB SERPL-MCNC: 1.2 MG/DL (ref 0.2–1)
BUN SERPL-MCNC: 11 MG/DL (ref 6–20)
BUN/CREAT SERPL: 13 (ref 12–20)
CALCIUM SERPL-MCNC: 9.1 MG/DL (ref 8.5–10.1)
CHLORIDE SERPL-SCNC: 103 MMOL/L (ref 97–108)
CO2 SERPL-SCNC: 29 MMOL/L (ref 21–32)
CREAT SERPL-MCNC: 0.82 MG/DL (ref 0.7–1.3)
DIFFERENTIAL METHOD BLD: ABNORMAL
EOSINOPHIL # BLD: 0.08 K/UL (ref 0–0.4)
EOSINOPHIL NFR BLD: 1.8 % (ref 0–7)
ERYTHROCYTE [DISTWIDTH] IN BLOOD BY AUTOMATED COUNT: 15.9 % (ref 11.5–14.5)
GLOBULIN SER CALC-MCNC: 3.7 G/DL (ref 2–4)
GLUCOSE SERPL-MCNC: 88 MG/DL (ref 65–100)
HCT VFR BLD AUTO: 41.7 % (ref 36.6–50.3)
HGB BLD-MCNC: 14.1 G/DL (ref 12.1–17)
IMM GRANULOCYTES # BLD AUTO: 0.02 K/UL (ref 0–0.04)
IMM GRANULOCYTES NFR BLD AUTO: 0.5 % (ref 0–0.5)
LYMPHOCYTES # BLD: 1.14 K/UL (ref 0.8–3.5)
LYMPHOCYTES NFR BLD: 25.9 % (ref 12–49)
MCH RBC QN AUTO: 29.1 PG (ref 26–34)
MCHC RBC AUTO-ENTMCNC: 33.8 G/DL (ref 30–36.5)
MCV RBC AUTO: 86.2 FL (ref 80–99)
MONOCYTES # BLD: 0.47 K/UL (ref 0–1)
MONOCYTES NFR BLD: 10.7 % (ref 5–13)
NEUTS SEG # BLD: 2.68 K/UL (ref 1.8–8)
NEUTS SEG NFR BLD: 60.6 % (ref 32–75)
NRBC # BLD: 0 K/UL (ref 0–0.01)
NRBC BLD-RTO: 0 PER 100 WBC
PLATELET # BLD AUTO: 260 K/UL (ref 150–400)
PMV BLD AUTO: 10.7 FL (ref 8.9–12.9)
POTASSIUM SERPL-SCNC: 4.9 MMOL/L (ref 3.5–5.1)
PROT SERPL-MCNC: 7.1 G/DL (ref 6.4–8.2)
RBC # BLD AUTO: 4.84 M/UL (ref 4.1–5.7)
SODIUM SERPL-SCNC: 137 MMOL/L (ref 136–145)
WBC # BLD AUTO: 4.4 K/UL (ref 4.1–11.1)

## 2025-06-16 PROCEDURE — 80053 COMPREHEN METABOLIC PANEL: CPT

## 2025-06-16 PROCEDURE — 85025 COMPLETE CBC W/AUTO DIFF WBC: CPT

## 2025-06-16 PROCEDURE — 99283 EMERGENCY DEPT VISIT LOW MDM: CPT

## 2025-06-16 PROCEDURE — 36415 COLL VENOUS BLD VENIPUNCTURE: CPT

## 2025-06-16 ASSESSMENT — PAIN DESCRIPTION - LOCATION: LOCATION: ABDOMEN

## 2025-06-16 ASSESSMENT — PAIN DESCRIPTION - DESCRIPTORS: DESCRIPTORS: ACHING

## 2025-06-16 ASSESSMENT — PAIN - FUNCTIONAL ASSESSMENT: PAIN_FUNCTIONAL_ASSESSMENT: 0-10

## 2025-06-16 ASSESSMENT — PAIN DESCRIPTION - ORIENTATION: ORIENTATION: RIGHT

## 2025-06-16 ASSESSMENT — PAIN DESCRIPTION - PAIN TYPE: TYPE: ACUTE PAIN

## 2025-06-16 ASSESSMENT — PAIN SCALES - GENERAL: PAINLEVEL_OUTOF10: 8

## 2025-06-16 NOTE — ED PROVIDER NOTES
[facility] EMERGENCY DEPARTMENT  EMERGENCY DEPARTMENT ENCOUNTER       Pt Name: Leander Lange  MRN: [id number]  Birthdate 1990  Date of evaluation: 6/16/2025  Provider: Claire Moncada MD   PCP: No primary care provider on file.  Note Started: 1:34 PM EDT 6/16/25     CHIEF COMPLAINT       Chief Complaint   Patient presents with    Abdominal Pain        HISTORY OF PRESENT ILLNESS: 1 or more elements      History From: Patient, History limited by: none     Leander Lange is a 35 y.o. male who presents with right-sided rib and abdominal pain.       Please See MDM for Additional Details of the HPI/PMH  Nursing Notes were all reviewed and agreed with or any disagreements were addressed in the HPI.     REVIEW OF SYSTEMS        Positives and Pertinent negatives as per HPI.    PAST HISTORY     Past Medical History:  Past Medical History:   Diagnosis Date    Other ill-defined conditions(799.89)     ADHD       Past Surgical History:  Past Surgical History:   Procedure Laterality Date    APPENDECTOMY      [place] UNLISTED PROCEDURE ABDOMEN PERITONEUM & OMENTUM      appendectomy       Family History:  History reviewed. No pertinent family history.    Social History:  Social History     Tobacco Use    Smoking status: Every Day     Current packs/day: 1.00     Types: Cigarettes    Smokeless tobacco: Never   Vaping Use    Vaping status: Never Used   Substance Use Topics    Alcohol use: Yes     Comment: occ    Drug use: Yes     Types: Marijuana (Weed)       Allergies:  Allergies   Allergen Reactions    Amoxicillin Hives    Adhesive Tape Rash       CURRENT MEDICATIONS      Previous Medications    MICONAZOLE (MICONAZOLE ANTIFUNGAL) 2 % CREAM    Apply topically 2 times daily for 7 days.    NAPROXEN (NAPROSYN) 500 MG TABLET    Take 1 tablet by mouth 2 times daily    PANTOPRAZOLE (PROTONIX) 40 MG TABLET    Take 1 tablet by mouth every morning (before breakfast)       SCREENINGS               No data recorded            PHYSICAL
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
## 2025-06-16 NOTE — ED NOTES
Pt presents to ED complaining of sharp, non-radiating abdominal pain and right upper back pain with bruises since last night. Pt denies N/V,diarrhea, falls and injuries. Pt states not taking any medication PTA. Pt is alert and oriented x 4, RR even and unlabored, skin is warm and dry. Pt appears in NAD at this time. Assessment completed and pt updated on plan of care.  Call bell in reach.  Emergency Department Nursing Plan of Care  The Nursing Plan of Care is developed from the Nursing assessment and Emergency Department Attending provider initial evaluation.  The plan of care may be reviewed in the “ED Provider note”.  The Plan of Care was developed with the following considerations:  Patient / Family readiness to learn indicated by:Refer to Medical chart in [facility]  Persons(s) to be included in education: Refer to Medical chart in [facility]  Barriers to Learning/Limitations:Normal

## 2025-08-11 ENCOUNTER — APPOINTMENT (OUTPATIENT)
Facility: HOSPITAL | Age: 35
End: 2025-08-11
Payer: MEDICAID

## 2025-08-11 ENCOUNTER — HOSPITAL ENCOUNTER (EMERGENCY)
Facility: HOSPITAL | Age: 35
Discharge: HOME | End: 2025-08-11
Attending: EMERGENCY MEDICINE
Payer: MEDICAID

## 2025-08-11 VITALS
WEIGHT: 315 LBS | HEIGHT: 72 IN | OXYGEN SATURATION: 96 % | RESPIRATION RATE: 18 BRPM | BODY MASS INDEX: 42.66 KG/M2 | DIASTOLIC BLOOD PRESSURE: 105 MMHG | SYSTOLIC BLOOD PRESSURE: 147 MMHG | TEMPERATURE: 97.9 F | HEART RATE: 82 BPM

## 2025-08-11 DIAGNOSIS — S29.011A PECTORALIS MUSCLE STRAIN, INITIAL ENCOUNTER: Primary | ICD-10-CM

## 2025-08-11 DIAGNOSIS — T14.8XXA MUSCULOSKELETAL STRAIN: ICD-10-CM

## 2025-08-11 PROCEDURE — 6370000000 HC RX 637 (ALT 250 FOR IP): Performed by: EMERGENCY MEDICINE

## 2025-08-11 PROCEDURE — 99284 EMERGENCY DEPT VISIT MOD MDM: CPT

## 2025-08-11 PROCEDURE — 71101 X-RAY EXAM UNILAT RIBS/CHEST: CPT

## 2025-08-11 PROCEDURE — 6360000002 HC RX W HCPCS: Performed by: EMERGENCY MEDICINE

## 2025-08-11 PROCEDURE — 96372 THER/PROPH/DIAG INJ SC/IM: CPT

## 2025-08-11 RX ORDER — NAPROXEN 500 MG/1
500 TABLET ORAL 2 TIMES DAILY
Qty: 60 TABLET | Refills: 0 | Status: SHIPPED | OUTPATIENT
Start: 2025-08-11

## 2025-08-11 RX ORDER — LIDOCAINE 50 MG/G
1 PATCH TOPICAL DAILY
Qty: 10 PATCH | Refills: 0 | Status: SHIPPED | OUTPATIENT
Start: 2025-08-11 | End: 2025-08-21

## 2025-08-11 RX ORDER — KETOROLAC TROMETHAMINE 30 MG/ML
30 INJECTION, SOLUTION INTRAMUSCULAR; INTRAVENOUS ONCE
Status: COMPLETED | OUTPATIENT
Start: 2025-08-11 | End: 2025-08-11

## 2025-08-11 RX ORDER — METHOCARBAMOL 750 MG/1
750 TABLET, FILM COATED ORAL 3 TIMES DAILY
Qty: 15 TABLET | Refills: 0 | Status: SHIPPED | OUTPATIENT
Start: 2025-08-11 | End: 2025-08-17

## 2025-08-11 RX ORDER — LIDOCAINE 4 G/G
1 PATCH TOPICAL
Status: DISCONTINUED | OUTPATIENT
Start: 2025-08-11 | End: 2025-08-11 | Stop reason: HOSPADM

## 2025-08-11 RX ORDER — METHOCARBAMOL 500 MG/1
1000 TABLET, FILM COATED ORAL ONCE
Status: COMPLETED | OUTPATIENT
Start: 2025-08-11 | End: 2025-08-11

## 2025-08-11 RX ADMIN — KETOROLAC TROMETHAMINE 30 MG: 30 INJECTION, SOLUTION INTRAMUSCULAR at 06:32

## 2025-08-11 RX ADMIN — METHOCARBAMOL 1000 MG: 500 TABLET ORAL at 06:32

## 2025-08-11 ASSESSMENT — ENCOUNTER SYMPTOMS
VOMITING: 0
EYE PAIN: 0
RHINORRHEA: 0
SHORTNESS OF BREATH: 0
ABDOMINAL PAIN: 0
SORE THROAT: 0
NAUSEA: 0
DIARRHEA: 0
COUGH: 0

## 2025-08-11 ASSESSMENT — PAIN DESCRIPTION - ORIENTATION: ORIENTATION: RIGHT

## 2025-08-11 ASSESSMENT — LIFESTYLE VARIABLES
HOW MANY STANDARD DRINKS CONTAINING ALCOHOL DO YOU HAVE ON A TYPICAL DAY: PATIENT DOES NOT DRINK
HOW OFTEN DO YOU HAVE A DRINK CONTAINING ALCOHOL: NEVER

## 2025-08-11 ASSESSMENT — PAIN DESCRIPTION - LOCATION: LOCATION: RIB CAGE

## 2025-08-11 ASSESSMENT — PAIN - FUNCTIONAL ASSESSMENT: PAIN_FUNCTIONAL_ASSESSMENT: 0-10

## 2025-08-11 ASSESSMENT — PAIN DESCRIPTION - DESCRIPTORS: DESCRIPTORS: PRESSURE

## 2025-08-11 ASSESSMENT — PAIN SCALES - GENERAL: PAINLEVEL_OUTOF10: 8
